# Patient Record
Sex: MALE | Race: OTHER | Employment: UNEMPLOYED | URBAN - NONMETROPOLITAN AREA
[De-identification: names, ages, dates, MRNs, and addresses within clinical notes are randomized per-mention and may not be internally consistent; named-entity substitution may affect disease eponyms.]

---

## 2024-11-01 ENCOUNTER — APPOINTMENT (OUTPATIENT)
Dept: CT IMAGING | Age: 24
End: 2024-11-01
Payer: MEDICAID

## 2024-11-01 ENCOUNTER — HOSPITAL ENCOUNTER (INPATIENT)
Age: 24
LOS: 6 days | Discharge: HOME | End: 2024-11-08
Attending: STUDENT IN AN ORGANIZED HEALTH CARE EDUCATION/TRAINING PROGRAM | Admitting: SURGERY
Payer: MEDICAID

## 2024-11-01 DIAGNOSIS — K35.80 ACUTE APPENDICITIS, UNSPECIFIED ACUTE APPENDICITIS TYPE: ICD-10-CM

## 2024-11-01 DIAGNOSIS — K63.89 CECUM MASS: Primary | ICD-10-CM

## 2024-11-01 DIAGNOSIS — K35.209 ACUTE APPENDICITIS WITH GENERALIZED PERITONITIS, UNSPECIFIED WHETHER ABSCESS PRESENT, UNSPECIFIED WHETHER GANGRENE PRESENT, UNSPECIFIED WHETHER PERFORATION PRESENT: ICD-10-CM

## 2024-11-01 LAB
ALBUMIN SERPL BCG-MCNC: 4.6 G/DL (ref 3.5–5.1)
ALP SERPL-CCNC: 74 U/L (ref 38–126)
ALT SERPL W/O P-5'-P-CCNC: 21 U/L (ref 11–66)
ANION GAP SERPL CALC-SCNC: 13 MEQ/L (ref 8–16)
AST SERPL-CCNC: 20 U/L (ref 5–40)
BILIRUB SERPL-MCNC: 0.9 MG/DL (ref 0.3–1.2)
BUN SERPL-MCNC: 10 MG/DL (ref 7–22)
CALCIUM SERPL-MCNC: 9.5 MG/DL (ref 8.5–10.5)
CHLORIDE SERPL-SCNC: 100 MEQ/L (ref 98–111)
CO2 SERPL-SCNC: 24 MEQ/L (ref 23–33)
CREAT SERPL-MCNC: 0.9 MG/DL (ref 0.4–1.2)
GFR SERPL CREATININE-BSD FRML MDRD: > 90 ML/MIN/1.73M2
GLUCOSE SERPL-MCNC: 110 MG/DL (ref 70–108)
LACTATE SERPL-SCNC: 1.4 MMOL/L (ref 0.5–2)
LIPASE SERPL-CCNC: 22.5 U/L (ref 5.6–51.3)
OSMOLALITY SERPL CALC.SUM OF ELEC: 273.5 MOSMOL/KG (ref 275–300)
POTASSIUM SERPL-SCNC: 3.9 MEQ/L (ref 3.5–5.2)
PROCALCITONIN SERPL IA-MCNC: 0.05 NG/ML (ref 0.01–0.09)
PROT SERPL-MCNC: 7.9 G/DL (ref 6.1–8)
SODIUM SERPL-SCNC: 137 MEQ/L (ref 135–145)

## 2024-11-01 PROCEDURE — 84145 PROCALCITONIN (PCT): CPT

## 2024-11-01 PROCEDURE — 6370000000 HC RX 637 (ALT 250 FOR IP): Performed by: STUDENT IN AN ORGANIZED HEALTH CARE EDUCATION/TRAINING PROGRAM

## 2024-11-01 PROCEDURE — 87636 SARSCOV2 & INF A&B AMP PRB: CPT

## 2024-11-01 PROCEDURE — 36415 COLL VENOUS BLD VENIPUNCTURE: CPT

## 2024-11-01 PROCEDURE — 99285 EMERGENCY DEPT VISIT HI MDM: CPT

## 2024-11-01 PROCEDURE — 6360000002 HC RX W HCPCS: Performed by: STUDENT IN AN ORGANIZED HEALTH CARE EDUCATION/TRAINING PROGRAM

## 2024-11-01 PROCEDURE — 96361 HYDRATE IV INFUSION ADD-ON: CPT

## 2024-11-01 PROCEDURE — 2580000003 HC RX 258: Performed by: STUDENT IN AN ORGANIZED HEALTH CARE EDUCATION/TRAINING PROGRAM

## 2024-11-01 PROCEDURE — 83605 ASSAY OF LACTIC ACID: CPT

## 2024-11-01 PROCEDURE — 96375 TX/PRO/DX INJ NEW DRUG ADDON: CPT

## 2024-11-01 PROCEDURE — 93005 ELECTROCARDIOGRAM TRACING: CPT | Performed by: STUDENT IN AN ORGANIZED HEALTH CARE EDUCATION/TRAINING PROGRAM

## 2024-11-01 PROCEDURE — 83690 ASSAY OF LIPASE: CPT

## 2024-11-01 PROCEDURE — 87040 BLOOD CULTURE FOR BACTERIA: CPT

## 2024-11-01 PROCEDURE — 74177 CT ABD & PELVIS W/CONTRAST: CPT

## 2024-11-01 PROCEDURE — 80053 COMPREHEN METABOLIC PANEL: CPT

## 2024-11-01 PROCEDURE — 85025 COMPLETE CBC W/AUTO DIFF WBC: CPT

## 2024-11-01 RX ORDER — IOPAMIDOL 755 MG/ML
80 INJECTION, SOLUTION INTRAVASCULAR
Status: COMPLETED | OUTPATIENT
Start: 2024-11-01 | End: 2024-11-02

## 2024-11-01 RX ORDER — 0.9 % SODIUM CHLORIDE 0.9 %
1000 INTRAVENOUS SOLUTION INTRAVENOUS ONCE
Status: COMPLETED | OUTPATIENT
Start: 2024-11-01 | End: 2024-11-02

## 2024-11-01 RX ORDER — MORPHINE SULFATE 4 MG/ML
4 INJECTION, SOLUTION INTRAMUSCULAR; INTRAVENOUS
Status: COMPLETED | OUTPATIENT
Start: 2024-11-01 | End: 2024-11-01

## 2024-11-01 RX ORDER — ONDANSETRON 2 MG/ML
4 INJECTION INTRAMUSCULAR; INTRAVENOUS ONCE
Status: COMPLETED | OUTPATIENT
Start: 2024-11-01 | End: 2024-11-01

## 2024-11-01 RX ORDER — ACETAMINOPHEN 500 MG
1000 TABLET ORAL ONCE
Status: COMPLETED | OUTPATIENT
Start: 2024-11-01 | End: 2024-11-01

## 2024-11-01 RX ADMIN — ACETAMINOPHEN 1000 MG: 500 TABLET ORAL at 23:22

## 2024-11-01 RX ADMIN — ONDANSETRON 4 MG: 2 INJECTION INTRAMUSCULAR; INTRAVENOUS at 23:21

## 2024-11-01 RX ADMIN — SODIUM CHLORIDE 1000 ML: 9 INJECTION, SOLUTION INTRAVENOUS at 23:20

## 2024-11-01 RX ADMIN — MORPHINE SULFATE 4 MG: 4 INJECTION, SOLUTION INTRAMUSCULAR; INTRAVENOUS at 23:22

## 2024-11-01 ASSESSMENT — PAIN SCALES - GENERAL
PAINLEVEL_OUTOF10: 10
PAINLEVEL_OUTOF10: 8

## 2024-11-01 ASSESSMENT — PAIN - FUNCTIONAL ASSESSMENT
PAIN_FUNCTIONAL_ASSESSMENT: 0-10
PAIN_FUNCTIONAL_ASSESSMENT: 0-10

## 2024-11-02 ENCOUNTER — ANESTHESIA EVENT (OUTPATIENT)
Dept: OPERATING ROOM | Age: 24
End: 2024-11-02
Payer: MEDICAID

## 2024-11-02 ENCOUNTER — ANESTHESIA (OUTPATIENT)
Dept: OPERATING ROOM | Age: 24
End: 2024-11-02
Payer: MEDICAID

## 2024-11-02 PROBLEM — K63.89 CECUM MASS: Status: ACTIVE | Noted: 2024-11-02

## 2024-11-02 PROBLEM — Z90.49 STATUS POST APPENDECTOMY: Status: ACTIVE | Noted: 2024-11-02

## 2024-11-02 LAB
BASOPHILS ABSOLUTE: 0 THOU/MM3 (ref 0–0.1)
BASOPHILS NFR BLD AUTO: 0.2 %
BILIRUB UR QL STRIP.AUTO: NEGATIVE
CHARACTER UR: CLEAR
COLOR, UA: YELLOW
DEPRECATED RDW RBC AUTO: 43 FL (ref 35–45)
EKG ATRIAL RATE: 114 BPM
EKG P AXIS: 59 DEGREES
EKG P-R INTERVAL: 172 MS
EKG Q-T INTERVAL: 298 MS
EKG QRS DURATION: 80 MS
EKG QTC CALCULATION (BAZETT): 410 MS
EKG R AXIS: 122 DEGREES
EKG T AXIS: 35 DEGREES
EKG VENTRICULAR RATE: 114 BPM
EOSINOPHIL NFR BLD AUTO: 0.3 %
EOSINOPHILS ABSOLUTE: 0.1 THOU/MM3 (ref 0–0.4)
ERYTHROCYTE [DISTWIDTH] IN BLOOD BY AUTOMATED COUNT: 12.5 % (ref 11.5–14.5)
FLUAV RNA RESP QL NAA+PROBE: NOT DETECTED
FLUBV RNA RESP QL NAA+PROBE: NOT DETECTED
GLUCOSE UR QL STRIP.AUTO: NEGATIVE MG/DL
HCT VFR BLD AUTO: 54.8 % (ref 42–52)
HGB BLD-MCNC: 18.5 GM/DL (ref 14–18)
HGB UR QL STRIP.AUTO: NEGATIVE
IMM GRANULOCYTES # BLD AUTO: 0.07 THOU/MM3 (ref 0–0.07)
IMM GRANULOCYTES NFR BLD AUTO: 0.4 %
KETONES UR QL STRIP.AUTO: NEGATIVE
LYMPHOCYTES ABSOLUTE: 2.5 THOU/MM3 (ref 1–4.8)
LYMPHOCYTES NFR BLD AUTO: 13.8 %
MCH RBC QN AUTO: 31.6 PG (ref 26–33)
MCHC RBC AUTO-ENTMCNC: 33.8 GM/DL (ref 32.2–35.5)
MCV RBC AUTO: 93.7 FL (ref 80–94)
MONOCYTES ABSOLUTE: 1.3 THOU/MM3 (ref 0.4–1.3)
MONOCYTES NFR BLD AUTO: 7 %
NEUTROPHILS ABSOLUTE: 14.3 THOU/MM3 (ref 1.8–7.7)
NEUTROPHILS NFR BLD AUTO: 78.3 %
NITRITE UR QL STRIP: NEGATIVE
NRBC BLD AUTO-RTO: 0 /100 WBC
PATHOLOGIST REVIEW: ABNORMAL
PH UR STRIP.AUTO: 6.5 [PH] (ref 5–9)
PLATELET # BLD AUTO: 208 THOU/MM3 (ref 130–400)
PMV BLD AUTO: 11.3 FL (ref 9.4–12.4)
PROT UR STRIP.AUTO-MCNC: NEGATIVE MG/DL
RBC # BLD AUTO: 5.85 MILL/MM3 (ref 4.7–6.1)
SARS-COV-2 RNA RESP QL NAA+PROBE: NOT DETECTED
SP GR UR REFRACT.AUTO: 1.02 (ref 1–1.03)
UROBILINOGEN, URINE: 1 EU/DL (ref 0–1)
WBC # BLD AUTO: 18.3 THOU/MM3 (ref 4.8–10.8)
WBC #/AREA URNS HPF: NEGATIVE /[HPF]

## 2024-11-02 PROCEDURE — 6360000002 HC RX W HCPCS: Performed by: STUDENT IN AN ORGANIZED HEALTH CARE EDUCATION/TRAINING PROGRAM

## 2024-11-02 PROCEDURE — 93010 ELECTROCARDIOGRAM REPORT: CPT | Performed by: INTERNAL MEDICINE

## 2024-11-02 PROCEDURE — 96365 THER/PROPH/DIAG IV INF INIT: CPT

## 2024-11-02 PROCEDURE — 2580000003 HC RX 258: Performed by: NURSE ANESTHETIST, CERTIFIED REGISTERED

## 2024-11-02 PROCEDURE — 2720000010 HC SURG SUPPLY STERILE: Performed by: SURGERY

## 2024-11-02 PROCEDURE — 6360000002 HC RX W HCPCS: Performed by: SURGERY

## 2024-11-02 PROCEDURE — 2580000003 HC RX 258: Performed by: SURGERY

## 2024-11-02 PROCEDURE — 3700000001 HC ADD 15 MINUTES (ANESTHESIA): Performed by: SURGERY

## 2024-11-02 PROCEDURE — 6360000002 HC RX W HCPCS: Performed by: OCCUPATIONAL THERAPIST

## 2024-11-02 PROCEDURE — 88307 TISSUE EXAM BY PATHOLOGIST: CPT

## 2024-11-02 PROCEDURE — 6360000002 HC RX W HCPCS: Performed by: NURSE ANESTHETIST, CERTIFIED REGISTERED

## 2024-11-02 PROCEDURE — 1200000000 HC SEMI PRIVATE

## 2024-11-02 PROCEDURE — 96361 HYDRATE IV INFUSION ADD-ON: CPT

## 2024-11-02 PROCEDURE — 0DTF0ZZ RESECTION OF RIGHT LARGE INTESTINE, OPEN APPROACH: ICD-10-PCS | Performed by: SURGERY

## 2024-11-02 PROCEDURE — 2500000003 HC RX 250 WO HCPCS: Performed by: NURSE ANESTHETIST, CERTIFIED REGISTERED

## 2024-11-02 PROCEDURE — 7100000000 HC PACU RECOVERY - FIRST 15 MIN: Performed by: SURGERY

## 2024-11-02 PROCEDURE — 2580000003 HC RX 258: Performed by: STUDENT IN AN ORGANIZED HEALTH CARE EDUCATION/TRAINING PROGRAM

## 2024-11-02 PROCEDURE — 3600000014 HC SURGERY LEVEL 4 ADDTL 15MIN: Performed by: SURGERY

## 2024-11-02 PROCEDURE — 81003 URINALYSIS AUTO W/O SCOPE: CPT

## 2024-11-02 PROCEDURE — 6360000004 HC RX CONTRAST MEDICATION: Performed by: STUDENT IN AN ORGANIZED HEALTH CARE EDUCATION/TRAINING PROGRAM

## 2024-11-02 PROCEDURE — 2709999900 HC NON-CHARGEABLE SUPPLY: Performed by: SURGERY

## 2024-11-02 PROCEDURE — 7100000001 HC PACU RECOVERY - ADDTL 15 MIN: Performed by: SURGERY

## 2024-11-02 PROCEDURE — 3700000000 HC ANESTHESIA ATTENDED CARE: Performed by: SURGERY

## 2024-11-02 PROCEDURE — 3600000004 HC SURGERY LEVEL 4 BASE: Performed by: SURGERY

## 2024-11-02 RX ORDER — PROPOFOL 10 MG/ML
INJECTION, EMULSION INTRAVENOUS
Status: DISCONTINUED | OUTPATIENT
Start: 2024-11-02 | End: 2024-11-02 | Stop reason: SDUPTHER

## 2024-11-02 RX ORDER — HYDROMORPHONE HYDROCHLORIDE 2 MG/ML
INJECTION, SOLUTION INTRAMUSCULAR; INTRAVENOUS; SUBCUTANEOUS
Status: DISCONTINUED | OUTPATIENT
Start: 2024-11-02 | End: 2024-11-02 | Stop reason: SDUPTHER

## 2024-11-02 RX ORDER — MEPERIDINE HYDROCHLORIDE 25 MG/ML
12.5 INJECTION INTRAMUSCULAR; INTRAVENOUS; SUBCUTANEOUS EVERY 5 MIN PRN
Status: DISCONTINUED | OUTPATIENT
Start: 2024-11-02 | End: 2024-11-02

## 2024-11-02 RX ORDER — NALOXONE HYDROCHLORIDE 0.4 MG/ML
INJECTION, SOLUTION INTRAMUSCULAR; INTRAVENOUS; SUBCUTANEOUS PRN
Status: DISCONTINUED | OUTPATIENT
Start: 2024-11-02 | End: 2024-11-02

## 2024-11-02 RX ORDER — SODIUM CHLORIDE 9 MG/ML
INJECTION, SOLUTION INTRAVENOUS CONTINUOUS
Status: DISCONTINUED | OUTPATIENT
Start: 2024-11-02 | End: 2024-11-07

## 2024-11-02 RX ORDER — SODIUM CHLORIDE 9 MG/ML
INJECTION, SOLUTION INTRAVENOUS PRN
Status: DISCONTINUED | OUTPATIENT
Start: 2024-11-02 | End: 2024-11-02

## 2024-11-02 RX ORDER — ONDANSETRON 2 MG/ML
4 INJECTION INTRAMUSCULAR; INTRAVENOUS EVERY 6 HOURS PRN
Status: DISCONTINUED | OUTPATIENT
Start: 2024-11-02 | End: 2024-11-08 | Stop reason: HOSPADM

## 2024-11-02 RX ORDER — SODIUM CHLORIDE 0.9 % (FLUSH) 0.9 %
5-40 SYRINGE (ML) INJECTION PRN
Status: DISCONTINUED | OUTPATIENT
Start: 2024-11-02 | End: 2024-11-02

## 2024-11-02 RX ORDER — DEXAMETHASONE SODIUM PHOSPHATE 10 MG/ML
INJECTION, EMULSION INTRAMUSCULAR; INTRAVENOUS
Status: DISCONTINUED | OUTPATIENT
Start: 2024-11-02 | End: 2024-11-02 | Stop reason: SDUPTHER

## 2024-11-02 RX ORDER — ONDANSETRON 4 MG/1
4 TABLET, ORALLY DISINTEGRATING ORAL EVERY 8 HOURS PRN
Status: DISCONTINUED | OUTPATIENT
Start: 2024-11-02 | End: 2024-11-08 | Stop reason: HOSPADM

## 2024-11-02 RX ORDER — ONDANSETRON 2 MG/ML
4 INJECTION INTRAMUSCULAR; INTRAVENOUS
Status: DISCONTINUED | OUTPATIENT
Start: 2024-11-02 | End: 2024-11-02

## 2024-11-02 RX ORDER — SODIUM CHLORIDE, SODIUM LACTATE, POTASSIUM CHLORIDE, CALCIUM CHLORIDE 600; 310; 30; 20 MG/100ML; MG/100ML; MG/100ML; MG/100ML
INJECTION, SOLUTION INTRAVENOUS
Status: DISCONTINUED | OUTPATIENT
Start: 2024-11-02 | End: 2024-11-02 | Stop reason: SDUPTHER

## 2024-11-02 RX ORDER — SUCCINYLCHOLINE/SOD CL,ISO/PF 200MG/10ML
SYRINGE (ML) INTRAVENOUS
Status: DISCONTINUED | OUTPATIENT
Start: 2024-11-02 | End: 2024-11-02 | Stop reason: SDUPTHER

## 2024-11-02 RX ORDER — PHENYLEPHRINE HCL IN 0.9% NACL 1 MG/10 ML
SYRINGE (ML) INTRAVENOUS
Status: DISCONTINUED | OUTPATIENT
Start: 2024-11-02 | End: 2024-11-02 | Stop reason: SDUPTHER

## 2024-11-02 RX ORDER — SODIUM CHLORIDE 0.9 % (FLUSH) 0.9 %
5-40 SYRINGE (ML) INJECTION EVERY 12 HOURS SCHEDULED
Status: DISCONTINUED | OUTPATIENT
Start: 2024-11-02 | End: 2024-11-02

## 2024-11-02 RX ORDER — BUPIVACAINE HYDROCHLORIDE 5 MG/ML
INJECTION, SOLUTION EPIDURAL; INTRACAUDAL PRN
Status: DISCONTINUED | OUTPATIENT
Start: 2024-11-02 | End: 2024-11-02 | Stop reason: ALTCHOICE

## 2024-11-02 RX ORDER — PROMETHAZINE HYDROCHLORIDE 25 MG/ML
6.25 INJECTION, SOLUTION INTRAMUSCULAR; INTRAVENOUS EVERY 6 HOURS PRN
Status: DISCONTINUED | OUTPATIENT
Start: 2024-11-02 | End: 2024-11-08 | Stop reason: HOSPADM

## 2024-11-02 RX ORDER — SODIUM CHLORIDE 9 MG/ML
INJECTION, SOLUTION INTRAVENOUS PRN
Status: DISCONTINUED | OUTPATIENT
Start: 2024-11-02 | End: 2024-11-07

## 2024-11-02 RX ORDER — FENTANYL CITRATE 50 UG/ML
INJECTION, SOLUTION INTRAMUSCULAR; INTRAVENOUS
Status: DISCONTINUED | OUTPATIENT
Start: 2024-11-02 | End: 2024-11-02 | Stop reason: SDUPTHER

## 2024-11-02 RX ORDER — LIDOCAINE HCL/PF 100 MG/5ML
SYRINGE (ML) INJECTION
Status: DISCONTINUED | OUTPATIENT
Start: 2024-11-02 | End: 2024-11-02 | Stop reason: SDUPTHER

## 2024-11-02 RX ORDER — ONDANSETRON 2 MG/ML
INJECTION INTRAMUSCULAR; INTRAVENOUS
Status: DISCONTINUED | OUTPATIENT
Start: 2024-11-02 | End: 2024-11-02 | Stop reason: SDUPTHER

## 2024-11-02 RX ORDER — SODIUM CHLORIDE 0.9 % (FLUSH) 0.9 %
5-40 SYRINGE (ML) INJECTION EVERY 12 HOURS SCHEDULED
Status: DISCONTINUED | OUTPATIENT
Start: 2024-11-02 | End: 2024-11-07

## 2024-11-02 RX ORDER — MIDAZOLAM HYDROCHLORIDE 1 MG/ML
INJECTION, SOLUTION INTRAMUSCULAR; INTRAVENOUS
Status: DISCONTINUED | OUTPATIENT
Start: 2024-11-02 | End: 2024-11-02 | Stop reason: SDUPTHER

## 2024-11-02 RX ORDER — ROCURONIUM BROMIDE 10 MG/ML
INJECTION, SOLUTION INTRAVENOUS
Status: DISCONTINUED | OUTPATIENT
Start: 2024-11-02 | End: 2024-11-02 | Stop reason: SDUPTHER

## 2024-11-02 RX ORDER — SODIUM CHLORIDE 0.9 % (FLUSH) 0.9 %
5-40 SYRINGE (ML) INJECTION PRN
Status: DISCONTINUED | OUTPATIENT
Start: 2024-11-02 | End: 2024-11-07

## 2024-11-02 RX ORDER — FENTANYL CITRATE 50 UG/ML
50 INJECTION, SOLUTION INTRAMUSCULAR; INTRAVENOUS EVERY 5 MIN PRN
Status: DISCONTINUED | OUTPATIENT
Start: 2024-11-02 | End: 2024-11-02

## 2024-11-02 RX ADMIN — DEXAMETHASONE SODIUM PHOSPHATE 10 MG: 10 INJECTION, EMULSION INTRAMUSCULAR; INTRAVENOUS at 02:07

## 2024-11-02 RX ADMIN — HYDROMORPHONE HYDROCHLORIDE 0.4 MG: 2 INJECTION INTRAMUSCULAR; INTRAVENOUS; SUBCUTANEOUS at 03:46

## 2024-11-02 RX ADMIN — ROCURONIUM BROMIDE 30 MG: 10 INJECTION INTRAVENOUS at 02:12

## 2024-11-02 RX ADMIN — Medication 140 MG: at 02:02

## 2024-11-02 RX ADMIN — SODIUM CHLORIDE: 9 INJECTION, SOLUTION INTRAVENOUS at 20:39

## 2024-11-02 RX ADMIN — HYDROMORPHONE HYDROCHLORIDE 0.4 MG: 2 INJECTION INTRAMUSCULAR; INTRAVENOUS; SUBCUTANEOUS at 03:38

## 2024-11-02 RX ADMIN — HYDROMORPHONE HYDROCHLORIDE 0.4 MG: 2 INJECTION INTRAMUSCULAR; INTRAVENOUS; SUBCUTANEOUS at 03:55

## 2024-11-02 RX ADMIN — Medication 100 MCG: at 02:22

## 2024-11-02 RX ADMIN — ONDANSETRON 4 MG: 2 INJECTION INTRAMUSCULAR; INTRAVENOUS at 03:22

## 2024-11-02 RX ADMIN — HYDROMORPHONE HYDROCHLORIDE 0.4 MG: 2 INJECTION INTRAMUSCULAR; INTRAVENOUS; SUBCUTANEOUS at 03:27

## 2024-11-02 RX ADMIN — ONDANSETRON 4 MG: 2 INJECTION INTRAMUSCULAR; INTRAVENOUS at 10:52

## 2024-11-02 RX ADMIN — Medication 100 MG: at 02:02

## 2024-11-02 RX ADMIN — SUGAMMADEX 200 MG: 100 INJECTION, SOLUTION INTRAVENOUS at 03:30

## 2024-11-02 RX ADMIN — HYDROMORPHONE HYDROCHLORIDE 1 MG: 1 INJECTION, SOLUTION INTRAMUSCULAR; INTRAVENOUS; SUBCUTANEOUS at 07:00

## 2024-11-02 RX ADMIN — IOPAMIDOL 80 ML: 755 INJECTION, SOLUTION INTRAVENOUS at 00:17

## 2024-11-02 RX ADMIN — SODIUM CHLORIDE, POTASSIUM CHLORIDE, SODIUM LACTATE AND CALCIUM CHLORIDE: 600; 310; 30; 20 INJECTION, SOLUTION INTRAVENOUS at 03:24

## 2024-11-02 RX ADMIN — HYDROMORPHONE HYDROCHLORIDE 1 MG: 1 INJECTION, SOLUTION INTRAMUSCULAR; INTRAVENOUS; SUBCUTANEOUS at 12:44

## 2024-11-02 RX ADMIN — HYDROMORPHONE HYDROCHLORIDE 0.4 MG: 2 INJECTION INTRAMUSCULAR; INTRAVENOUS; SUBCUTANEOUS at 03:51

## 2024-11-02 RX ADMIN — PROPOFOL 150 MG: 10 INJECTION, EMULSION INTRAVENOUS at 02:02

## 2024-11-02 RX ADMIN — Medication 100 MCG: at 02:38

## 2024-11-02 RX ADMIN — FENTANYL CITRATE 100 MCG: 50 INJECTION, SOLUTION INTRAMUSCULAR; INTRAVENOUS at 02:00

## 2024-11-02 RX ADMIN — PIPERACILLIN AND TAZOBACTAM 4500 MG: 4; .5 INJECTION, POWDER, FOR SOLUTION INTRAVENOUS at 01:08

## 2024-11-02 RX ADMIN — ONDANSETRON 4 MG: 2 INJECTION INTRAMUSCULAR; INTRAVENOUS at 20:01

## 2024-11-02 RX ADMIN — ROCURONIUM BROMIDE 20 MG: 10 INJECTION INTRAVENOUS at 02:45

## 2024-11-02 RX ADMIN — PIPERACILLIN AND TAZOBACTAM 3375 MG: 3; .375 INJECTION, POWDER, FOR SOLUTION INTRAVENOUS at 23:15

## 2024-11-02 RX ADMIN — PIPERACILLIN AND TAZOBACTAM 3375 MG: 3; .375 INJECTION, POWDER, FOR SOLUTION INTRAVENOUS at 15:03

## 2024-11-02 RX ADMIN — SODIUM CHLORIDE: 9 INJECTION, SOLUTION INTRAVENOUS at 04:48

## 2024-11-02 RX ADMIN — MIDAZOLAM 2 MG: 1 INJECTION INTRAMUSCULAR; INTRAVENOUS at 01:59

## 2024-11-02 RX ADMIN — PIPERACILLIN AND TAZOBACTAM 3375 MG: 3; .375 INJECTION, POWDER, FOR SOLUTION INTRAVENOUS at 07:07

## 2024-11-02 RX ADMIN — SODIUM CHLORIDE, POTASSIUM CHLORIDE, SODIUM LACTATE AND CALCIUM CHLORIDE: 600; 310; 30; 20 INJECTION, SOLUTION INTRAVENOUS at 01:56

## 2024-11-02 RX ADMIN — SODIUM CHLORIDE: 9 INJECTION, SOLUTION INTRAVENOUS at 12:49

## 2024-11-02 RX ADMIN — PROMETHAZINE HYDROCHLORIDE 6.25 MG: 25 INJECTION INTRAMUSCULAR; INTRAVENOUS at 21:18

## 2024-11-02 ASSESSMENT — PAIN DESCRIPTION - LOCATION
LOCATION: ABDOMEN

## 2024-11-02 ASSESSMENT — PAIN - FUNCTIONAL ASSESSMENT
PAIN_FUNCTIONAL_ASSESSMENT: ACTIVITIES ARE NOT PREVENTED
PAIN_FUNCTIONAL_ASSESSMENT: 0-10
PAIN_FUNCTIONAL_ASSESSMENT: ACTIVITIES ARE NOT PREVENTED

## 2024-11-02 ASSESSMENT — PAIN SCALES - GENERAL
PAINLEVEL_OUTOF10: 10
PAINLEVEL_OUTOF10: 1
PAINLEVEL_OUTOF10: 8
PAINLEVEL_OUTOF10: 3

## 2024-11-02 ASSESSMENT — PAIN DESCRIPTION - ORIENTATION
ORIENTATION: MID

## 2024-11-02 ASSESSMENT — PAIN DESCRIPTION - PAIN TYPE
TYPE: SURGICAL PAIN
TYPE: SURGICAL PAIN

## 2024-11-02 ASSESSMENT — PAIN DESCRIPTION - DESCRIPTORS
DESCRIPTORS: SORE;TENDER
DESCRIPTORS: ACHING;SORE
DESCRIPTORS: SORE;TENDER
DESCRIPTORS: ACHING;SORE
DESCRIPTORS: SHARP

## 2024-11-02 ASSESSMENT — PAIN SCALES - WONG BAKER
WONGBAKER_NUMERICALRESPONSE: HURTS A LITTLE BIT

## 2024-11-02 NOTE — ANESTHESIA PRE PROCEDURE
Department of Anesthesiology  Preprocedure Note       Name:  Gabino Nowak   Age:  24 y.o.  :  2000                                          MRN:  [id number]         Date:  2024      Surgeon: Surgeon(s):  Pietro Castro MD    Procedure: Procedure(s):  APPENDECTOMY LAPAROSCOPIC    Medications prior to admission:   Prior to Admission medications    Not on File       Current medications:    No current facility-administered medications for this encounter.       Allergies:  No Known Allergies    Problem List:  There is no problem list on file for this patient.      Past Medical History:  History reviewed. No pertinent past medical history.    Past Surgical History:  History reviewed. No pertinent surgical history.    Social History:    Social History     Tobacco Use   • Smoking status: Not on file   • Smokeless tobacco: Not on file   Substance Use Topics   • Alcohol use: Not on file                                Counseling given: Not Answered      Vital Signs (Current):   Vitals:    24 2136 24 2326 24 0041   BP: 129/83 116/68 106/68   Pulse: (!) 119 (!) 117 97   Resp: 16 17 16   Temp: (!) 101.1 °F (38.4 °C)     TempSrc: Oral     SpO2: 95% 96% 94%   Weight: 81.6 kg (180 lb)                                                BP Readings from Last 3 Encounters:   24 106/68       NPO Status:                                                                                 BMI:   Wt Readings from Last 3 Encounters:   24 81.6 kg (180 lb)     There is no height or weight on file to calculate BMI.    CBC:   Lab Results   Component Value Date/Time    WBC 18.3 2024 09:56 PM    RBC 5.85 2024 09:56 PM    HGB 18.5 2024 09:56 PM    HCT 54.8 2024 09:56 PM    MCV 93.7 2024 09:56 PM     2024 09:56 PM       CMP:   Lab Results   Component Value Date/Time     2024 09:56 PM    K 3.9 2024 09:56 PM     2024 09:56 PM    CO2 24

## 2024-11-02 NOTE — CONSULTS
Session ID: [id number]  Language: Upper sorbian   ID: #[id number]   Name: Manny Liang Olena Velez  (RN)  2021 00:42:49

## 2024-11-02 NOTE — ED PROVIDER NOTES
[facility]  EMERGENCY DEPARTMENT ENCOUNTER      Patient Name: Gabino Nowak  MRN: [id number]  YOB: 2000  Date of Evaluation: 11/1/2024  Attending Physician: Hayden Rodriguez MD    CHIEF COMPLAINT       Chief Complaint   Patient presents with    Abdominal Pain       HISTORY OF PRESENT ILLNESS    HPI    History obtained from chart review and the patient.  History and discussion with patient was using the Occitan video .    Gabino is a 24 y.o. old male who presents to the emergency department by Walk In for evaluation of right lower quadrant pain, fever, decreased appetite.  No other chronic medical problems, does use a vape with nicotine daily.  No prior abdominal surgeries.  Today started having right lower quadrant pain and this morning is gotten worse over the course of the day worse with movement.  Felt warm like he had a fever at home but did not take any medications at home.  Has only had a small piece of chocolate and water today and no nausea or vomiting.    Attempted chart review, no prior visits available to review.      REVIEW OF SYSTEMS   Review of Systems  Negative unless documented in HPI    PAST MEDICAL AND SURGICAL HISTORY   Gabino  has no past medical history on file.    Gabino  has no past surgical history on file.    CURRENT MEDICATIONS   Gabino is not on any long-term medications.    ALLERGIES   Gabino has No Known Allergies.    FAMILY HISTORY   Gabino family history is not on file.    SOCIAL HISTORY   Gabino      PHYSICAL EXAM     ED Triage Vitals [11/01/24 2136]   BP Systolic BP Percentile Diastolic BP Percentile Temp Temp Source Pulse Respirations SpO2   129/83 -- -- (!) 101.1 °F (38.4 °C) Oral (!) 119 16 95 %      Height Weight - Scale         -- 81.6 kg (180 lb)             Initial vital signs and nursing assessment reviewed and abnormal from tachycardia, febrile . There is no height or weight on file to calculate BMI.     Vitals:    11/01/24  2136 11/01/24 2326 11/02/24 0041   BP: 129/83 116/68 106/68   Pulse: (!) 119 (!) 117 97   Resp: 16 17 16   Temp: (!) 101.1 °F (38.4 °C)     TempSrc: Oral     SpO2: 95% 96% 94%   Weight: 81.6 kg (180 lb)           Physical Exam  Vitals and nursing note reviewed.   Constitutional:       General: He is not in acute distress.     Appearance: Normal appearance. He is normal weight. He is not ill-appearing, toxic-appearing or diaphoretic.   HENT:      Head: Normocephalic and atraumatic.      Nose: Nose normal.      Mouth/Throat:      Mouth: Mucous membranes are moist.      Pharynx: Oropharynx is clear.   Eyes:      Conjunctiva/sclera: Conjunctivae normal.   Cardiovascular:      Rate and Rhythm: Normal rate and regular rhythm.      Pulses: Normal pulses.      Heart sounds: Normal heart sounds.   Pulmonary:      Effort: Pulmonary effort is normal.      Breath sounds: Normal breath sounds.   Abdominal:      General: Abdomen is flat. Bowel sounds are normal.      Palpations: Abdomen is soft.      Tenderness: There is abdominal tenderness in the right lower quadrant and suprapubic area. There is guarding. There is no rebound. Positive signs include McBurney's sign and psoas sign. Negative signs include Rovsing's sign and obturator sign.   Musculoskeletal:      Cervical back: Normal range of motion.   Skin:     General: Skin is warm and dry.      Capillary Refill: Capillary refill takes less than 2 seconds.   Neurological:      Mental Status: He is alert and oriented to person, place, and time.            FORMAL DIAGNOSTIC RESULTS   RADIOLOGY: Interpretation per the Radiologist below, if available at the time of this note (none if blank):  CT ABDOMEN PELVIS W IV CONTRAST Additional Contrast? None   Final Result   Impression:   High-grade inflammatory process involving the cecum and proximal right    colon as well as terminal ileum into lesser extent proximal appendix. See    above for discussion. General surgery consultation

## 2024-11-02 NOTE — PLAN OF CARE
Problem: Discharge Planning  Goal: Discharge to home or other facility with appropriate resources  11/2/2024 1229 by Eboni Mendoza, RN  Outcome: Progressing  Flowsheets (Taken 11/2/2024 0425 by Adama Hsu, RN)  Discharge to home or other facility with appropriate resources:   Identify barriers to discharge with patient and caregiver   Arrange for needed discharge resources and transportation as appropriate   Identify discharge learning needs (meds, wound care, etc)   Refer to discharge planning if patient needs post-hospital services based on physician order or complex needs related to functional status, cognitive ability or social support system  11/2/2024 0507 by Adama Hsu, RN  Outcome: Progressing  Flowsheets (Taken 11/2/2024 0425)  Discharge to home or other facility with appropriate resources:   Identify barriers to discharge with patient and caregiver   Arrange for needed discharge resources and transportation as appropriate   Identify discharge learning needs (meds, wound care, etc)   Refer to discharge planning if patient needs post-hospital services based on physician order or complex needs related to functional status, cognitive ability or social support system     Problem: Pain  Goal: Verbalizes/displays adequate comfort level or baseline comfort level  11/2/2024 1229 by Eboni Mendoza, RN  Outcome: Progressing  Flowsheets (Taken 11/2/2024 0507 by Adama Hsu, RN)  Verbalizes/displays adequate comfort level or baseline comfort level:   Encourage patient to monitor pain and request assistance   Administer analgesics based on type and severity of pain and evaluate response   Assess pain using appropriate pain scale   Implement non-pharmacological measures as appropriate and evaluate response   Notify Licensed Independent Practitioner if interventions unsuccessful or patient reports new pain   Consider cultural and social influences on pain and pain management  11/2/2024 0507 by

## 2024-11-02 NOTE — ED NOTES
Pt reports to the ED due to abdominal pain that started this morning. Pt is concerned for appendicitis. Pt states pain started gradually and has gotten worse. Pt states he is unable to sit or stand. Pt has not taken anything for the pain. Pt is unable to locate the area of the pain in his abdomen. Pt rates pain 10/10.

## 2024-11-02 NOTE — PROGRESS NOTES
0348 Pt awake and oriented on arrival to PACU , HOB elevated , pt c/o # 6-7 Abd pain per  [place] [id number] , medicated with Dilaudid per MATTHEW Martines   355 eyes closed resp easy   415 awakens to name , states pain tolerable and drifts back to sleep   419 Meets criteria for discharge , transported to

## 2024-11-02 NOTE — OP NOTE
[address] [address]                            OPERATIVE REPORT      PATIENT NAME: BRIAN ROSALES           : 2000  MED REC NO: [id number]                       ROOM: [facility]  ACCOUNT NO: [id number]                       ADMIT DATE: 2024  PROVIDER: Pietro Castro MD      DATE OF PROCEDURE:  2024    SURGEON:  Pietro Castro MD    PREOPERATIVE DIAGNOSIS:  Possible acute appendicitis.    POSTOPERATIVE DIAGNOSIS:  Inflammatory cecal mass.    OPERATIONS:    1. Laparoscopy.  2. Converted to open right colon resection.    ANESTHESIA:  General.    COMPLICATIONS:  None.    BLOOD LOSS:  30 mL.    INDICATION FOR PROCEDURE:  The patient is a 24-year-old male who presented with a rather sudden onset today of right lower quadrant pain.  CT scan initially read more inflammation than would be expected just in less than 24-hour appendicitis, but he is brought to surgery because of elevated white count and exam with the peritonitis.    FINDINGS:  The appendix on laparoscopy did appear normal.  You could see there was brown fluid along the gutter.  There was an inflammatory mass of small bowel attached to the cecum, and the patient was converted to open.  There was no evidence of Crohn's once he was opened, but there was an inflammatory mass of fat literally adhered to the cecal wall.  Initially, I thought it could be a twisted appendices epiploicae, but it was absolutely again plastered to the cecum.  There was inflammation of the ascending colon, and I elected to do a limited right colon resection.    PROCEDURE DESCRIPTION:  Patient was brought to the operating suite, placed supine on the operating room table.  After adequate inhalational anesthesia was administered, the abdomen was prepped and draped in the usual sterile fashion.  An incision was made below the umbilicus.  A Veress needle was placed, the CO2 was insufflated to  a pressure of 15.  The Veress needle was removed.  The trocar was placed and a camera was placed through the trocar verifying intraabdominal placement of the trocar.  I then placed a 5 mm suprapubic port and the appendix could be seen in the gutter, and I elevated it up and it looked little swollen, but clearly not inflamed and then you could see the small bowel attached to the cecum in an inflammatory process.  I was able to strip some of the small bowel off, it did not look like Crohn's, but there was some sort of inflammatory mass of the cecum.  For this reason, the trocars were removed.  Incision was made in the midline, taken down through the subcutaneous tissue down to the linea alba.  The abdominal cavity was entered.  Now visually, you could see this inflammatory mass in the cecal wall.  It was fat with some pus on it, it did not really look like a twisted appendices epiploica, but there was no way to dissect this off the cecum.  Not knowing exactly the process, I elected to do a right colon resection with obviously the appendix coming attached to the cecum.  AIMEE was fired across the distal ileum.  The mesentery of the distal ileum and right colon was taken down.  We went around the hepatic flexure and we first taken down the peritoneal reflection of the right colon to free it up.  We chose a point just past the hepatic flexure on the transverse colon, fired a AIMEE and then completed the mesenteric clamping and dissection and removed the right colon.  A side-to-side anastomosis was performed with the AIMEE.  Opening closed with a TA60 stapler.  We did reinforce the staple line with Lembert sutures.  I did partially close the mesentery as much as we could.  We irrigated with 400 mL of saline and then 400 mL of IrriSept and then closure was begun.  The fascia was closed with #1 Vicryl sutures.  Skin was closed with staples.  Patient tolerated the procedure well.          DYANA SOUZA MD      D:  11/02/2024

## 2024-11-02 NOTE — BRIEF OP NOTE
Brief Postoperative Note      Patient: Gabino Nowak  YOB: 2000  MRN: [id number]    Date of Procedure: 11/2/2024    Pre-Op Diagnosis Codes:      * Acute appendicitis, unspecified acute appendicitis type [K35.80]    Post-Op Diagnosis: Inflammatory Mass Cecum       Procedure(s):  ATTEMPTED APPENDECTOMY LAPAROSCOPIC, CONVERTED TO OPEN RIGHT COLON RESECTION    Surgeon(s):  Pietro Castro MD    Assistant:      Anesthesia: General    Estimated Blood Loss (mL): 30 ml    Complications: Inability to perform Laparoscopically    Specimens:   ID Type Source Tests Collected by Time Destination   A : Right Colon Tissue Colon SURGICAL PATHOLOGY Pietro Castro MD 11/2/2024 0301        Implants:        Drains:   NG/OG/NJ/NE Tube Nasogastric 18 fr Left nostril (Active)       Findings:  Infection Present At Time Of Surgery (PATOS) (choose all levels that have infection present):  - Organ Space infection (below fascia) present as evidenced by purulent fluid  Other Findings: see op note    Electronically signed by Pietro Castro MD on 11/2/2024 at 3:39 AM

## 2024-11-02 NOTE — PLAN OF CARE
Problem: Discharge Planning  Goal: Discharge to home or other facility with appropriate resources  Outcome: Progressing  Flowsheets (Taken 11/2/2024 0425)  Discharge to home or other facility with appropriate resources:   Identify barriers to discharge with patient and caregiver   Arrange for needed discharge resources and transportation as appropriate   Identify discharge learning needs (meds, wound care, etc)   Refer to discharge planning if patient needs post-hospital services based on physician order or complex needs related to functional status, cognitive ability or social support system     Problem: Pain  Goal: Verbalizes/displays adequate comfort level or baseline comfort level  Outcome: Progressing  Flowsheets (Taken 11/2/2024 0507)  Verbalizes/displays adequate comfort level or baseline comfort level:   Encourage patient to monitor pain and request assistance   Administer analgesics based on type and severity of pain and evaluate response   Assess pain using appropriate pain scale   Implement non-pharmacological measures as appropriate and evaluate response   Notify Licensed Independent Practitioner if interventions unsuccessful or patient reports new pain   Consider cultural and social influences on pain and pain management     Problem: ABCDS Injury Assessment  Goal: Absence of physical injury  Outcome: Progressing  Flowsheets (Taken 11/2/2024 0507)  Absence of Physical Injury: Implement safety measures based on patient assessment   Careplan reviewed with patient. Patient verbalizes understanding of plan of care and contributes towards goals of care.      Patient educated on how to use incentive spirometer. Patient verbalized understanding and demonstrated proper use. Emphasized importance and usage of device, with coughing and deep breathing every 2 hours while awake.

## 2024-11-02 NOTE — PROGRESS NOTES
Patient arrives to the pre-op holding area with  tablet at bedside. Pre-op checklist is performed and consents are obtained by surgeon and anaesthesiologist with use of an . Questions and concerns are addressed with patient.

## 2024-11-02 NOTE — ANESTHESIA POSTPROCEDURE EVALUATION
Department of Anesthesiology  Postprocedure Note    Patient: Gabino Nowak  MRN: [id number]  YOB: 2000  Date of evaluation: 11/2/2024    Procedure Summary       Date: 11/02/24 Room / Location: [facility] OR 03 / [facility] OR    Anesthesia Start: 0156 Anesthesia Stop: 0350    Procedure: ATTEMPTED APPENDECTOMY LAPAROSCOPIC, CONVERTED TO OPEN RIGHT COLON RESECTION (Abdomen) Diagnosis:       Acute appendicitis, unspecified acute appendicitis type      (Acute appendicitis, unspecified acute appendicitis type [K35.80])    Surgeons: Pietro Castro MD Responsible Provider: Odell Chairez DO    Anesthesia Type: general ASA Status: 1 - Emergent            Anesthesia Type: No value filed.    Temi Phase I: Temi Score: 10    Temi Phase II:      Anesthesia Post Evaluation    Patient location during evaluation: floor  Patient participation: complete - patient participated  Level of consciousness: awake  Airway patency: patent  Nausea & Vomiting: no vomiting and no nausea  Cardiovascular status: hemodynamically stable  Respiratory status: acceptable  Hydration status: stable  Comments: Talked to pt.'s care giving nurse and pt.  Pain management: adequate    No notable events documented.

## 2024-11-03 LAB
BASOPHILS ABSOLUTE: 0 THOU/MM3 (ref 0–0.1)
BASOPHILS NFR BLD AUTO: 0.2 %
DEPRECATED RDW RBC AUTO: 43.7 FL (ref 35–45)
EOSINOPHIL NFR BLD AUTO: 0 %
EOSINOPHILS ABSOLUTE: 0 THOU/MM3 (ref 0–0.4)
ERYTHROCYTE [DISTWIDTH] IN BLOOD BY AUTOMATED COUNT: 12.7 % (ref 11.5–14.5)
HCT VFR BLD AUTO: 49.9 % (ref 42–52)
HGB BLD-MCNC: 16.7 GM/DL (ref 14–18)
IMM GRANULOCYTES # BLD AUTO: 0.09 THOU/MM3 (ref 0–0.07)
IMM GRANULOCYTES NFR BLD AUTO: 0.5 %
LYMPHOCYTES ABSOLUTE: 2.2 THOU/MM3 (ref 1–4.8)
LYMPHOCYTES NFR BLD AUTO: 11.8 %
MCH RBC QN AUTO: 31.3 PG (ref 26–33)
MCHC RBC AUTO-ENTMCNC: 33.5 GM/DL (ref 32.2–35.5)
MCV RBC AUTO: 93.4 FL (ref 80–94)
MONOCYTES ABSOLUTE: 1.7 THOU/MM3 (ref 0.4–1.3)
MONOCYTES NFR BLD AUTO: 9.5 %
NEUTROPHILS ABSOLUTE: 14.4 THOU/MM3 (ref 1.8–7.7)
NEUTROPHILS NFR BLD AUTO: 78 %
NRBC BLD AUTO-RTO: 0 /100 WBC
PLATELET # BLD AUTO: 211 THOU/MM3 (ref 130–400)
PMV BLD AUTO: 11.3 FL (ref 9.4–12.4)
RBC # BLD AUTO: 5.34 MILL/MM3 (ref 4.7–6.1)
WBC # BLD AUTO: 18.4 THOU/MM3 (ref 4.8–10.8)

## 2024-11-03 PROCEDURE — 6360000002 HC RX W HCPCS: Performed by: SURGERY

## 2024-11-03 PROCEDURE — 2580000003 HC RX 258: Performed by: SURGERY

## 2024-11-03 PROCEDURE — 36415 COLL VENOUS BLD VENIPUNCTURE: CPT

## 2024-11-03 PROCEDURE — 1200000000 HC SEMI PRIVATE

## 2024-11-03 PROCEDURE — 85025 COMPLETE CBC W/AUTO DIFF WBC: CPT

## 2024-11-03 RX ADMIN — PIPERACILLIN AND TAZOBACTAM 3375 MG: 3; .375 INJECTION, POWDER, FOR SOLUTION INTRAVENOUS at 23:08

## 2024-11-03 RX ADMIN — SODIUM CHLORIDE: 9 INJECTION, SOLUTION INTRAVENOUS at 03:48

## 2024-11-03 RX ADMIN — HYDROMORPHONE HYDROCHLORIDE 1 MG: 1 INJECTION, SOLUTION INTRAMUSCULAR; INTRAVENOUS; SUBCUTANEOUS at 23:20

## 2024-11-03 RX ADMIN — ONDANSETRON 4 MG: 2 INJECTION INTRAMUSCULAR; INTRAVENOUS at 23:20

## 2024-11-03 RX ADMIN — SODIUM CHLORIDE: 9 INJECTION, SOLUTION INTRAVENOUS at 11:52

## 2024-11-03 RX ADMIN — SODIUM CHLORIDE: 9 INJECTION, SOLUTION INTRAVENOUS at 20:17

## 2024-11-03 RX ADMIN — PIPERACILLIN AND TAZOBACTAM 3375 MG: 3; .375 INJECTION, POWDER, FOR SOLUTION INTRAVENOUS at 14:58

## 2024-11-03 RX ADMIN — PIPERACILLIN AND TAZOBACTAM 3375 MG: 3; .375 INJECTION, POWDER, FOR SOLUTION INTRAVENOUS at 06:33

## 2024-11-03 ASSESSMENT — PAIN SCALES - WONG BAKER
WONGBAKER_NUMERICALRESPONSE: HURTS A LITTLE BIT

## 2024-11-03 ASSESSMENT — PAIN SCALES - GENERAL
PAINLEVEL_OUTOF10: 6
PAINLEVEL_OUTOF10: 3
PAINLEVEL_OUTOF10: 2

## 2024-11-03 ASSESSMENT — PAIN DESCRIPTION - FREQUENCY
FREQUENCY: CONTINUOUS
FREQUENCY: CONTINUOUS

## 2024-11-03 ASSESSMENT — PAIN DESCRIPTION - LOCATION
LOCATION: ABDOMEN
LOCATION: ABDOMEN

## 2024-11-03 ASSESSMENT — PAIN DESCRIPTION - PAIN TYPE
TYPE: SURGICAL PAIN
TYPE: SURGICAL PAIN

## 2024-11-03 ASSESSMENT — PAIN - FUNCTIONAL ASSESSMENT
PAIN_FUNCTIONAL_ASSESSMENT: ACTIVITIES ARE NOT PREVENTED
PAIN_FUNCTIONAL_ASSESSMENT: ACTIVITIES ARE NOT PREVENTED

## 2024-11-03 ASSESSMENT — PAIN DESCRIPTION - DESCRIPTORS
DESCRIPTORS: ACHING;SORE
DESCRIPTORS: ACHING;SORE

## 2024-11-03 ASSESSMENT — PAIN DESCRIPTION - ORIENTATION
ORIENTATION: MID
ORIENTATION: MID

## 2024-11-03 NOTE — PLAN OF CARE
Problem: Discharge Planning  Goal: Discharge to home or other facility with appropriate resources  11/2/2024 2045 by Adama Hsu, RN  Outcome: Progressing  Flowsheets (Taken 11/2/2024 2040)  Discharge to home or other facility with appropriate resources:   Identify barriers to discharge with patient and caregiver   Arrange for needed discharge resources and transportation as appropriate   Identify discharge learning needs (meds, wound care, etc)   Refer to discharge planning if patient needs post-hospital services based on physician order or complex needs related to functional status, cognitive ability or social support system  11/2/2024 1229 by Eboni Mendoza, RN  Outcome: Progressing  Flowsheets (Taken 11/2/2024 0425 by Adama Hsu, RN)  Discharge to home or other facility with appropriate resources:   Identify barriers to discharge with patient and caregiver   Arrange for needed discharge resources and transportation as appropriate   Identify discharge learning needs (meds, wound care, etc)   Refer to discharge planning if patient needs post-hospital services based on physician order or complex needs related to functional status, cognitive ability or social support system     Problem: Pain  Goal: Verbalizes/displays adequate comfort level or baseline comfort level  11/2/2024 2045 by Adama Hsu, RN  Outcome: Progressing  Flowsheets (Taken 11/2/2024 0507)  Verbalizes/displays adequate comfort level or baseline comfort level:   Encourage patient to monitor pain and request assistance   Administer analgesics based on type and severity of pain and evaluate response   Assess pain using appropriate pain scale   Implement non-pharmacological measures as appropriate and evaluate response   Notify Licensed Independent Practitioner if interventions unsuccessful or patient reports new pain   Consider cultural and social influences on pain and pain management  11/2/2024 1229 by Eboni Mendoza,

## 2024-11-03 NOTE — PLAN OF CARE
Problem: Discharge Planning  Goal: Discharge to home or other facility with appropriate resources  Outcome: Progressing  Flowsheets (Taken 11/2/2024 2040 by Adama Hsu, RN)  Discharge to home or other facility with appropriate resources:   Identify barriers to discharge with patient and caregiver   Arrange for needed discharge resources and transportation as appropriate   Identify discharge learning needs (meds, wound care, etc)   Refer to discharge planning if patient needs post-hospital services based on physician order or complex needs related to functional status, cognitive ability or social support system     Problem: Pain  Goal: Verbalizes/displays adequate comfort level or baseline comfort level  Outcome: Progressing  Flowsheets (Taken 11/2/2024 0507 by Adama Hsu, RN)  Verbalizes/displays adequate comfort level or baseline comfort level:   Encourage patient to monitor pain and request assistance   Administer analgesics based on type and severity of pain and evaluate response   Assess pain using appropriate pain scale   Implement non-pharmacological measures as appropriate and evaluate response   Notify Licensed Independent Practitioner if interventions unsuccessful or patient reports new pain   Consider cultural and social influences on pain and pain management     Problem: ABCDS Injury Assessment  Goal: Absence of physical injury  Outcome: Progressing  Flowsheets (Taken 11/2/2024 0507 by Adama Hsu, RN)  Absence of Physical Injury: Implement safety measures based on patient assessment     Problem: Safety - Adult  Goal: Free from fall injury  Outcome: Progressing  Flowsheets (Taken 11/2/2024 1229)  Free From Fall Injury: Instruct family/caregiver on patient safety   Patient stated understanding of the above care plan.

## 2024-11-03 NOTE — FLOWSHEET NOTE
11/02/24 2101   Treatment Team Notification   Reason for Communication Patient/Family request   Name of Team Member Notified Britney Perez   Treatment Team Role Physician Assistant   Method of Communication Secure Message   Response See orders   Notification Time 2101     Notified PA at this time per patient request due to increased nausea unrelieved by PRN Zofran. PA placed orders for PRN phenergan at this time.

## 2024-11-04 ENCOUNTER — APPOINTMENT (OUTPATIENT)
Dept: GENERAL RADIOLOGY | Age: 24
End: 2024-11-04
Payer: MEDICAID

## 2024-11-04 PROCEDURE — 6360000002 HC RX W HCPCS: Performed by: OCCUPATIONAL THERAPIST

## 2024-11-04 PROCEDURE — 2580000003 HC RX 258: Performed by: SURGERY

## 2024-11-04 PROCEDURE — 74018 RADEX ABDOMEN 1 VIEW: CPT

## 2024-11-04 PROCEDURE — 6360000002 HC RX W HCPCS: Performed by: SURGERY

## 2024-11-04 PROCEDURE — 6370000000 HC RX 637 (ALT 250 FOR IP): Performed by: SURGERY

## 2024-11-04 PROCEDURE — 1200000000 HC SEMI PRIVATE

## 2024-11-04 RX ORDER — SCOLOPAMINE TRANSDERMAL SYSTEM 1 MG/1
1 PATCH, EXTENDED RELEASE TRANSDERMAL
Status: DISCONTINUED | OUTPATIENT
Start: 2024-11-04 | End: 2024-11-08 | Stop reason: HOSPADM

## 2024-11-04 RX ADMIN — ONDANSETRON 4 MG: 2 INJECTION INTRAMUSCULAR; INTRAVENOUS at 12:46

## 2024-11-04 RX ADMIN — ONDANSETRON 4 MG: 2 INJECTION INTRAMUSCULAR; INTRAVENOUS at 19:22

## 2024-11-04 RX ADMIN — PROMETHAZINE HYDROCHLORIDE 6.25 MG: 25 INJECTION INTRAMUSCULAR; INTRAVENOUS at 11:06

## 2024-11-04 RX ADMIN — ONDANSETRON 4 MG: 2 INJECTION INTRAMUSCULAR; INTRAVENOUS at 06:02

## 2024-11-04 RX ADMIN — HYDROMORPHONE HYDROCHLORIDE 1 MG: 1 INJECTION, SOLUTION INTRAMUSCULAR; INTRAVENOUS; SUBCUTANEOUS at 06:43

## 2024-11-04 RX ADMIN — PIPERACILLIN AND TAZOBACTAM 3375 MG: 3; .375 INJECTION, POWDER, FOR SOLUTION INTRAVENOUS at 06:42

## 2024-11-04 RX ADMIN — PIPERACILLIN AND TAZOBACTAM 3375 MG: 3; .375 INJECTION, POWDER, FOR SOLUTION INTRAVENOUS at 14:58

## 2024-11-04 RX ADMIN — PIPERACILLIN AND TAZOBACTAM 3375 MG: 3; .375 INJECTION, POWDER, FOR SOLUTION INTRAVENOUS at 23:00

## 2024-11-04 RX ADMIN — SODIUM CHLORIDE: 9 INJECTION, SOLUTION INTRAVENOUS at 20:41

## 2024-11-04 RX ADMIN — SODIUM CHLORIDE: 9 INJECTION, SOLUTION INTRAVENOUS at 04:43

## 2024-11-04 ASSESSMENT — PAIN SCALES - GENERAL
PAINLEVEL_OUTOF10: 7
PAINLEVEL_OUTOF10: 0

## 2024-11-04 NOTE — PLAN OF CARE
Problem: Discharge Planning  Goal: Discharge to home or other facility with appropriate resources  11/4/2024 0855 by Anita Becker RN  Outcome: Progressing  Flowsheets (Taken 11/4/2024 0855)  Discharge to home or other facility with appropriate resources:   Identify barriers to discharge with patient and caregiver   Identify discharge learning needs (meds, wound care, etc)   Arrange for needed discharge resources and transportation as appropriate   Arrange for interpreters to assist at discharge as needed     Problem: Pain  Goal: Verbalizes/displays adequate comfort level or baseline comfort level  11/4/2024 0855 by Anita Becker RN  Outcome: Progressing  Flowsheets (Taken 11/4/2024 0855)  Verbalizes/displays adequate comfort level or baseline comfort level:   Encourage patient to monitor pain and request assistance   Assess pain using appropriate pain scale   Implement non-pharmacological measures as appropriate and evaluate response   Administer analgesics based on type and severity of pain and evaluate response     Problem: ABCDS Injury Assessment  Goal: Absence of physical injury  11/4/2024 0855 by Anita Becker RN  Outcome: Progressing  Flowsheets (Taken 11/4/2024 0855)  Absence of Physical Injury: Implement safety measures based on patient assessment     Problem: Safety - Adult  Goal: Free from fall injury  11/4/2024 0855 by Anita Becker RN  Outcome: Progressing  Flowsheets (Taken 11/4/2024 0855)  Free From Fall Injury:   Instruct family/caregiver on patient safety   Based on caregiver fall risk screen, instruct family/caregiver to ask for assistance with transferring infant if caregiver noted to have fall risk factors   Care plan reviewed with Pt. Pt verbalizes understanding of plan of care and contributes to goal setting.

## 2024-11-04 NOTE — PROGRESS NOTES
Surg POD#3pt on cl liq had emesis x 2 abd really soft very minimal distention few BS ck KUB likely mild ileus  yumiko CBC in AM     Addendum KUB as noted below shows ileus as expected patient also had another emesis will make n.p.o. continue Zosyn exam this morning showed no evidence of anastomotic leak but will follow await pathology findings at surgery again coordinated with CT findings of significant inflammation of the cecum and ascending colon and ileum including the base of the appendix but again this was not appendicitis  ROCEDURE: XR ABDOMEN (KUB) (SINGLE AP VIEW)     CLINICAL INFORMATION: s/p ileocecectomy     COMPARISON: No comparison available.     TECHNIQUE: 2 supine images of the abdomen were obtained.     FINDINGS:  There is moderate gas distention of multiple small bowel loops following recent  surgery in addition to portions of the colon. Multiple skin staples are present  from recent surgery. Kidneys are somewhat obscured.. No definite renal or  ureteral calculi are seen..     IMPRESSION:  Moderate postop ileus.           **This report has been created using voice recognition software.  It may contain  minor errors which are inherent in voice recognition technology.**              Electronically signed by Dr. Alpesh Mathis

## 2024-11-04 NOTE — H&P
[place] [id number]                            PREOPERATIVE H&P      PATIENT NAME: BRIAN ROSALES           : 2000  MED REC NO: [id number]                       ROOM: 6A09  ACCOUNT NO: [id number]                       ADMIT DATE: 2024  PROVIDER: Pietro Castro MD      CHIEF COMPLAINT:  Possible appendicitis.    HISTORY OF PRESENT ILLNESS:  The patient is a 24-year-old [place] male who does not speak English.  We talked with him via an , but apparently he is otherwise healthy.  No prior surgeries or medical issues, who apparently woke up this morning with right-sided abdominal pain.  He presented to our emergency room, was noted to have a white count of 18,000 and a CT scan being read as high-grade inflammatory process involving the cecum and proximal right colon as well as terminal ileum and to a lesser extent the proximal appendix.  It was uncertain that this was appendicitis, but he had peritoneal irritation and surgical consultation has been requested.    PAST MEDICAL HISTORY:  None.    MEDICAL ILLNESSES:  None.    PAST SURGICAL HISTORY:  None.    MEDICATIONS:  None.    SOCIAL HISTORY:  He is a nonsmoker.  Social alcohol user.    REVIEW OF SYSTEMS:  Ten-point review of systems is negative.    FAMILY HISTORY:  Unknown.    PHYSICAL EXAMINATION:  GENERAL:  The patient is a 24-year-old male, appears his age.  HEAD, EARS, EYES, NOSE, AND THROAT:  Show no scleral icterus.  CARDIOVASCULAR:  S1, S2.  RESPIRATIONS:  Clear.  ABDOMEN:  Somewhat obese.  Bowel sounds are present, but diminished.  Left side of the abdomen is negative.  Right side of the abdomen shows particularly over McBurney's point rebound tenderness.  Groins are negative.  EXTREMITIES:  The upper and lower extremities are within normal limits.    LABORATORY DATA:  Revealed a sodium of 137, potassium of 3.9, BUN of 10, and creatinine of 0.9.  Liver

## 2024-11-04 NOTE — PLAN OF CARE
Problem: Discharge Planning  Goal: Discharge to home or other facility with appropriate resources  11/3/2024 2006 by Alyssa Pryor RN  Outcome: Progressing  Flowsheets (Taken 11/3/2024 2006)  Discharge to home or other facility with appropriate resources:   Identify barriers to discharge with patient and caregiver   Identify discharge learning needs (meds, wound care, etc)   Arrange for needed discharge resources and transportation as appropriate     Problem: Pain  Goal: Verbalizes/displays adequate comfort level or baseline comfort level  11/3/2024 2006 by Alyssa Pryor RN  Outcome: Progressing  Flowsheets (Taken 11/3/2024 2006)  Verbalizes/displays adequate comfort level or baseline comfort level:   Encourage patient to monitor pain and request assistance   Administer analgesics based on type and severity of pain and evaluate response   Assess pain using appropriate pain scale   Implement non-pharmacological measures as appropriate and evaluate response     Problem: ABCDS Injury Assessment  Goal: Absence of physical injury  11/3/2024 2006 by Alyssa Pryor, RN  Outcome: Progressing  Flowsheets (Taken 11/3/2024 2006)  Absence of Physical Injury: Implement safety measures based on patient assessment     Problem: Safety - Adult  Goal: Free from fall injury  11/3/2024 2006 by Alyssa Pryor, RN  Outcome: Progressing  Flowsheets (Taken 11/3/2024 2006)  Free From Fall Injury: Instruct family/caregiver on patient safety   Care plan reviewed with patient. patient verbalize understanding of plan of care and contribute to goal setting.

## 2024-11-05 LAB
DEPRECATED RDW RBC AUTO: 43.8 FL (ref 35–45)
ERYTHROCYTE [DISTWIDTH] IN BLOOD BY AUTOMATED COUNT: 12.4 % (ref 11.5–14.5)
HCT VFR BLD AUTO: 47.9 % (ref 42–52)
HGB BLD-MCNC: 15.8 GM/DL (ref 14–18)
MCH RBC QN AUTO: 31.5 PG (ref 26–33)
MCHC RBC AUTO-ENTMCNC: 33 GM/DL (ref 32.2–35.5)
MCV RBC AUTO: 95.6 FL (ref 80–94)
PLATELET # BLD AUTO: 241 THOU/MM3 (ref 130–400)
PMV BLD AUTO: 11 FL (ref 9.4–12.4)
RBC # BLD AUTO: 5.01 MILL/MM3 (ref 4.7–6.1)
WBC # BLD AUTO: 11.9 THOU/MM3 (ref 4.8–10.8)

## 2024-11-05 PROCEDURE — 2580000003 HC RX 258: Performed by: SURGERY

## 2024-11-05 PROCEDURE — 6360000002 HC RX W HCPCS: Performed by: SURGERY

## 2024-11-05 PROCEDURE — 36415 COLL VENOUS BLD VENIPUNCTURE: CPT

## 2024-11-05 PROCEDURE — 1200000000 HC SEMI PRIVATE

## 2024-11-05 PROCEDURE — 85027 COMPLETE CBC AUTOMATED: CPT

## 2024-11-05 RX ADMIN — PIPERACILLIN AND TAZOBACTAM 3375 MG: 3; .375 INJECTION, POWDER, FOR SOLUTION INTRAVENOUS at 06:31

## 2024-11-05 RX ADMIN — PIPERACILLIN AND TAZOBACTAM 3375 MG: 3; .375 INJECTION, POWDER, FOR SOLUTION INTRAVENOUS at 23:02

## 2024-11-05 RX ADMIN — PIPERACILLIN AND TAZOBACTAM 3375 MG: 3; .375 INJECTION, POWDER, FOR SOLUTION INTRAVENOUS at 14:35

## 2024-11-05 RX ADMIN — SODIUM CHLORIDE: 9 INJECTION, SOLUTION INTRAVENOUS at 04:43

## 2024-11-05 RX ADMIN — SODIUM CHLORIDE: 9 INJECTION, SOLUTION INTRAVENOUS at 21:06

## 2024-11-05 RX ADMIN — SODIUM CHLORIDE: 9 INJECTION, SOLUTION INTRAVENOUS at 12:46

## 2024-11-05 ASSESSMENT — PAIN SCALES - GENERAL: PAINLEVEL_OUTOF10: 0

## 2024-11-05 NOTE — PLAN OF CARE
Problem: Discharge Planning  Goal: Discharge to home or other facility with appropriate resources  11/4/2024 2206 by Alyssa Pryor RN  Outcome: Progressing  Flowsheets (Taken 11/4/2024 2206)  Discharge to home or other facility with appropriate resources:   Identify barriers to discharge with patient and caregiver   Identify discharge learning needs (meds, wound care, etc)   Arrange for needed discharge resources and transportation as appropriate     Problem: Pain  Goal: Verbalizes/displays adequate comfort level or baseline comfort level  11/4/2024 2206 by Alyssa Pryor RN  Outcome: Progressing  Flowsheets (Taken 11/4/2024 2206)  Verbalizes/displays adequate comfort level or baseline comfort level:   Encourage patient to monitor pain and request assistance   Administer analgesics based on type and severity of pain and evaluate response   Assess pain using appropriate pain scale   Implement non-pharmacological measures as appropriate and evaluate response     Problem: ABCDS Injury Assessment  Goal: Absence of physical injury  11/4/2024 2206 by Alyssa Pryor RN  Outcome: Progressing  Flowsheets (Taken 11/4/2024 2206)  Absence of Physical Injury: Implement safety measures based on patient assessment     Problem: Safety - Adult  Goal: Free from fall injury  11/4/2024 2206 by Alyssa Pryor RN  Outcome: Progressing  Flowsheets (Taken 11/4/2024 2206)  Free From Fall Injury: Instruct family/caregiver on patient safety   Care plan reviewed with patient. patient verbalize understanding of plan of care and contribute to goal setting.

## 2024-11-05 NOTE — PLAN OF CARE
Problem: Discharge Planning  Goal: Discharge to home or other facility with appropriate resources  Outcome: Progressing  Flowsheets (Taken 11/4/2024 2206 by Alyssa Pryor, RN)  Discharge to home or other facility with appropriate resources:   Identify barriers to discharge with patient and caregiver   Identify discharge learning needs (meds, wound care, etc)   Arrange for needed discharge resources and transportation as appropriate     Problem: Pain  Goal: Verbalizes/displays adequate comfort level or baseline comfort level  Outcome: Progressing  Flowsheets (Taken 11/4/2024 2206 by Alyssa Pryor, RN)  Verbalizes/displays adequate comfort level or baseline comfort level:   Encourage patient to monitor pain and request assistance   Administer analgesics based on type and severity of pain and evaluate response   Assess pain using appropriate pain scale   Implement non-pharmacological measures as appropriate and evaluate response     Problem: ABCDS Injury Assessment  Goal: Absence of physical injury  Outcome: Progressing  Flowsheets (Taken 11/4/2024 2206 by Alyssa Pryor, RN)  Absence of Physical Injury: Implement safety measures based on patient assessment     Problem: Safety - Adult  Goal: Free from fall injury  Outcome: Progressing  Flowsheets (Taken 11/4/2024 2206 by Alyssa Pryor, RN)  Free From Fall Injury: Instruct family/caregiver on patient safety   Patient stated understanding of the above care plan.

## 2024-11-05 NOTE — PROGRESS NOTES
[facility] [place]   Dr. Pietro Castro MD  Daily Progress Note  Pt Name: Gabino Nowak  Medical Record Number: [id number]  Date of Birth 2000   Today's Date: 11/5/2024    POD# 4    CHIEF COMPLAINTStatus post laparoscopy converted to right colon resection  Encounter was using the portable   SUBJECTIVE  Patient Denies nausea today    OBJECTIVE  CURRENT VITALS /64   Pulse 84   Temp 98 °F (36.7 °C) (Oral)   Resp 16   Wt 81.6 kg (180 lb)   SpO2 98%   LUNGS: Lungs clear   ABDOMEN: Soft bowel sounds are presentNo real pain to palpation  WOUNDS: Staple line intact  24 HR INTAKE/OUTPUT :   Intake/Output Summary (Last 24 hours) at 11/5/2024 1005  Last data filed at 11/5/2024 0300  Gross per 24 hour   Intake 2397.51 ml   Output 451 ml   Net 1946.51 ml     DRAIN/TUBE OUTPUT :      LABS  CBC :   Lab Results   Component Value Date/Time    WBC 11.9 11/05/2024 06:28 AM    HGB 15.8 11/05/2024 06:28 AM    HCT 47.9 11/05/2024 06:28 AM     11/05/2024 06:28 AM     BMP:   Lab Results   Component Value Date/Time     11/01/2024 09:56 PM    K 3.9 11/01/2024 09:56 PM     11/01/2024 09:56 PM    CO2 24 11/01/2024 09:56 PM    BUN 10 11/01/2024 09:56 PM    CREATININE 0.9 11/01/2024 09:56 PM       ASSESSMENT  1. Staple line intact no drainage pathology is still pending as of this morningWhite blood cell count has come down to near normal at 11.9Would continue the Zosyn until white count normal will recheck tomorrow.Also discussed with patient apparently sister had come in yesterday also had had an appendectomy and apparently was concerned that something had gone wrong during surgeryFrom the standpoint that he now has an incisionWe discussed with the patient via again the  that the appendix appeared normal the problem was with the bowel and the bowel wallEven when the review of the CT scan CT scan again showed significant inflammation of the small bowel and circumferential  inflammation of the colon which also would be unusual for appendicitisNonetheless we discussed the white blood cell count is coming down nicely he has on antibioticsWe will restart clear liquidsKUB yesterday showed a mild ileus as would not be too surprisingClinically he is better will begin clear liquids recheck white blood cell count tomorrow continue antibiotics      PLAN  1. As above      Pietro Castro MD  Electronically signed 11/5/2024 at 10:05 AM

## 2024-11-05 NOTE — PROGRESS NOTES
Pt was in bed and alone. He does not speak English. We used sign language. He was blessed.    11/05/24 1332   Encounter Summary   Encounter Overview/Reason Initial Encounter   Service Provided For Patient   Referral/Consult From Rounding   Support System Unknown   Last Encounter  11/05/24   Complexity of Encounter Low   Spiritual/Emotional needs   Type Spiritual Support   Assessment/Intervention/Outcome   Assessment Other (Comment)

## 2024-11-05 NOTE — CARE COORDINATION
Case Management Assessment Initial Evaluation    Date/Time of Evaluation: 2024 11:35 AM  Assessment Completed by: Eboni Lauren RN    If patient is discharged prior to next notation, then this note serves as note for discharge by case management.    Patient Name: Gabino Nowak                   YOB: 2000  Diagnosis: Acute appendicitis with generalized peritonitis, unspecified whether abscess present, unspecified whether gangrene present, unspecified whether perforation present [K35.209]  Status post appendectomy [Z90.49]  Cecum mass [K63.89]                   Date / Time: 2024  9:29 PM  Location: [facility]     Patient Admission Status: Inpatient   Readmission Risk Low 0-14, Mod 15-19), High > 20: Readmission Risk Score: 3.9    Current PCP: No primary care provider on file.  Health Care Decision Makers:     Additional Case Management Notes: From ED, WBC 11.9, pain and nausea control, clear liquid diet, IV fluids, IV Zosyn.    Procedures:    ATTEMPTED APPENDECTOMY LAPAROSCOPIC, CONVERTED TO OPEN RIGHT COLON RESECTION     Imagin/1 CT Abdomen Pelvis W IV Contrast High-grade inflammatory process involving the cecum and proximal right   colon as well as terminal ileum into lesser extent proximal appendix. See   above for discussion. General surgery consultation recommended.      KUB Moderate postop ileus     Patient Goals/Plan/Treatment Preferences: Met with Gabino. He currently lives at home with his wife. He is independent. Plan is to return home at discharge. He denies need for DME and declines HH. He is agreeable to [facility] for PCP. Per Cherry at [employer], encouraged pt to get [place] Medicaid switched to [place]. Pt verbalizes understanding.This interaction was done with  line.        [phone]   Service Assessment   Patient Orientation Alert and Oriented   Cognition Alert   History Provided By Patient   Primary Caregiver Self    Support Systems Spouse/Significant Other   Patient's Healthcare Decision Maker is: Patient Declined (Legal Next of Kin Remains as Decision Maker)   PCP Verified by CM Yes  (agreeable to residents [facility])   Last Visit to PCP Within last two years   Prior Functional Level Independent in ADLs/IADLs   Current Functional Level Independent in ADLs/IADLs   Can patient return to prior living arrangement Yes   Ability to make needs known: Good   Family able to assist with home care needs: Yes   Would you like for me to discuss the discharge plan with any other family members/significant others, and if so, who? No   Financial Resources Medicaid   Community Resources None   Social/Functional History   Home Equipment None   Active  Yes   Discharge Planning   Type of Residence House   Living Arrangements Spouse/Significant Other   Current Services Prior To Admission None   Potential Assistance Needed N/A   DME Ordered? No   Potential Assistance Purchasing Medications No   Type of Home Care Services None   Patient expects to be discharged to: House   Services At/After Discharge   Confirm Follow Up Transport Self   Condition of Participation: Discharge Planning   The Plan for Transition of Care is related to the following treatment goals: to go home

## 2024-11-06 LAB
DEPRECATED RDW RBC AUTO: 43.4 FL (ref 35–45)
ERYTHROCYTE [DISTWIDTH] IN BLOOD BY AUTOMATED COUNT: 12.3 % (ref 11.5–14.5)
HCT VFR BLD AUTO: 45 % (ref 42–52)
HGB BLD-MCNC: 14.9 GM/DL (ref 14–18)
MCH RBC QN AUTO: 31.7 PG (ref 26–33)
MCHC RBC AUTO-ENTMCNC: 33.1 GM/DL (ref 32.2–35.5)
MCV RBC AUTO: 95.7 FL (ref 80–94)
PLATELET # BLD AUTO: 231 THOU/MM3 (ref 130–400)
PMV BLD AUTO: 10.9 FL (ref 9.4–12.4)
RBC # BLD AUTO: 4.7 MILL/MM3 (ref 4.7–6.1)
WBC # BLD AUTO: 10.4 THOU/MM3 (ref 4.8–10.8)

## 2024-11-06 PROCEDURE — 6360000002 HC RX W HCPCS: Performed by: SURGERY

## 2024-11-06 PROCEDURE — 2580000003 HC RX 258: Performed by: SURGERY

## 2024-11-06 PROCEDURE — 85027 COMPLETE CBC AUTOMATED: CPT

## 2024-11-06 PROCEDURE — 36415 COLL VENOUS BLD VENIPUNCTURE: CPT

## 2024-11-06 PROCEDURE — 1200000000 HC SEMI PRIVATE

## 2024-11-06 RX ADMIN — PIPERACILLIN AND TAZOBACTAM 3375 MG: 3; .375 INJECTION, POWDER, FOR SOLUTION INTRAVENOUS at 23:58

## 2024-11-06 RX ADMIN — SODIUM CHLORIDE: 9 INJECTION, SOLUTION INTRAVENOUS at 05:08

## 2024-11-06 RX ADMIN — SODIUM CHLORIDE: 9 INJECTION, SOLUTION INTRAVENOUS at 12:55

## 2024-11-06 RX ADMIN — SODIUM CHLORIDE: 9 INJECTION, SOLUTION INTRAVENOUS at 23:59

## 2024-11-06 RX ADMIN — PIPERACILLIN AND TAZOBACTAM 3375 MG: 3; .375 INJECTION, POWDER, FOR SOLUTION INTRAVENOUS at 06:31

## 2024-11-06 RX ADMIN — PIPERACILLIN AND TAZOBACTAM 3375 MG: 3; .375 INJECTION, POWDER, FOR SOLUTION INTRAVENOUS at 15:20

## 2024-11-06 ASSESSMENT — PAIN SCALES - GENERAL: PAINLEVEL_OUTOF10: 0

## 2024-11-06 NOTE — PLAN OF CARE
Problem: Discharge Planning  Goal: Discharge to home or other facility with appropriate resources  11/6/2024 0949 by Sturgeon, Cara B, RN  Outcome: Progressing  Flowsheets (Taken 11/6/2024 0949)  Discharge to home or other facility with appropriate resources:   Identify barriers to discharge with patient and caregiver   Identify discharge learning needs (meds, wound care, etc)     Problem: Pain  Goal: Verbalizes/displays adequate comfort level or baseline comfort level  11/6/2024 0949 by Sturgeon, Cara B, RN  Outcome: Progressing  Flowsheets (Taken 11/6/2024 0949)  Verbalizes/displays adequate comfort level or baseline comfort level:   Encourage patient to monitor pain and request assistance   Assess pain using appropriate pain scale   Administer analgesics based on type and severity of pain and evaluate response   Implement non-pharmacological measures as appropriate and evaluate response     Problem: ABCDS Injury Assessment  Goal: Absence of physical injury  11/6/2024 0949 by Sturgeon, Cara B, RN  Outcome: Progressing  Flowsheets (Taken 11/6/2024 0949)  Absence of Physical Injury: Implement safety measures based on patient assessment     Problem: Safety - Adult  Goal: Free from fall injury  11/6/2024 0949 by Sturgeon, Cara B, RN  Outcome: Progressing  Flowsheets (Taken 11/6/2024 0949)  Free From Fall Injury: Instruct family/caregiver on patient safety     Problem: Gastrointestinal - Adult  Goal: Minimal or absence of nausea and vomiting  Outcome: Progressing  Flowsheets (Taken 11/6/2024 0949)  Minimal or absence of nausea and vomiting:   Administer IV fluids as ordered to ensure adequate hydration   Administer ordered antiemetic medications as needed   Provide nonpharmacologic comfort measures as appropriate     Problem: Gastrointestinal - Adult  Goal: Maintains or returns to baseline bowel function  Outcome: Progressing  Flowsheets (Taken 11/6/2024 0949)  Maintains or returns to baseline bowel function:

## 2024-11-06 NOTE — PROGRESS NOTES
Cognitive Concerns/ Orientation : Disoriented to time and situation  BEHAVIOR & AGGRESSION TOOL COLOR: Green  CIWA SCORE: N/a    ABNL VS/O2: Soft BP 90s/50s, other VSS on RA  MOBILITY: Assist x 1, GB and walker  PAIN MANAGMENT: Denies  DIET: Mod CHO; good intake  BOWEL/BLADDER: Incontinent of bladder  ABNL LAB/BG: , 132  DRAIN/DEVICES: None  TELEMETRY RHYTHM: N/a  SKIN: Wound to LLE, dressing CDI. Mepilex to coccyx for protection  TESTS/PROCEDURES: N/a  D/C DAY/GOALS/PLACE: Posible discharge today. COVID-19 results came back Negative per [facility]  OTHER IMPORTANT INFO: Contact and modified droplet precautions discontinued.   [facility] [place]   Dr. Pietro Castro MD  Daily Progress Note  Pt Name: Gabino Nowak  Medical Record Number: [id number]  Date of Birth 2000   Today's Date: 11/6/2024    POD# 5    CHIEF COMPLAINTStatus post laparoscopy with conversion to right colon resectionPathology has come back showing acute diverticulitis of the cecum    SUBJECTIVE  Patient feels Better is smiling no nausea    OBJECTIVE  CURRENT VITALS /61   Pulse 70   Temp 98.1 °F (36.7 °C) (Oral)   Resp 16   Wt 81.6 kg (180 lb)   SpO2 98%   LUNGS: Lungs clear   ABDOMEN: Soft bowel sounds are present but still somewhat slowNo real pain to palpation  WOUNDS: Staple line intact looks good  24 HR INTAKE/OUTPUT :   Intake/Output Summary (Last 24 hours) at 11/6/2024 1603  Last data filed at 11/6/2024 1254  Gross per 24 hour   Intake 6761.1 ml   Output 1000 ml   Net 5761.1 ml     DRAIN/TUBE OUTPUT :      LABS  CBC :   Lab Results   Component Value Date/Time    WBC 10.4 11/06/2024 06:33 AM    HGB 14.9 11/06/2024 06:33 AM    HCT 45.0 11/06/2024 06:33 AM     11/06/2024 06:33 AM     BMP:   Lab Results   Component Value Date/Time     11/01/2024 09:56 PM    K 3.9 11/01/2024 09:56 PM     11/01/2024 09:56 PM    CO2 24 11/01/2024 09:56 PM    BUN 10 11/01/2024 09:56 PM    CREATININE 0.9 11/01/2024 09:56 PM   INAL DIAGNOSIS:   Terminal ileum, cecum, appendix and ascending colon, right   hemicolectomy:    Diverticulosis with associated diverticulitis and acute serositis.     Specimen:   RIGHT COLON       Gross Examination:     The specimen container is labeled Gabino Nowak, right colon.  The   specimen is received in formalin and consists of a right colon specimen   consisting of the terminal ileum, cecum, appendix, and ascending colon.    The specimen has two stapled margins.  The portion of terminal ileum   measures 9.5 cm in length x 2.5 cm in average diameter.  The portion of   cecum measures 8.5 cm in length x 7.0

## 2024-11-06 NOTE — PLAN OF CARE
Problem: Discharge Planning  Goal: Discharge to home or other facility with appropriate resources  11/5/2024 2216 by Alyssa Pryor RN  Outcome: Progressing  Flowsheets (Taken 11/5/2024 2216)  Discharge to home or other facility with appropriate resources:   Identify barriers to discharge with patient and caregiver   Identify discharge learning needs (meds, wound care, etc)   Arrange for needed discharge resources and transportation as appropriate     Problem: Pain  Goal: Verbalizes/displays adequate comfort level or baseline comfort level  11/5/2024 2216 by Alyssa Pryor RN  Outcome: Progressing  Flowsheets (Taken 11/5/2024 2216)  Verbalizes/displays adequate comfort level or baseline comfort level:   Administer analgesics based on type and severity of pain and evaluate response   Encourage patient to monitor pain and request assistance   Implement non-pharmacological measures as appropriate and evaluate response   Assess pain using appropriate pain scale     Problem: ABCDS Injury Assessment  Goal: Absence of physical injury  11/5/2024 2216 by Alyssa Pryor RN  Outcome: Progressing  Flowsheets (Taken 11/5/2024 2216)  Absence of Physical Injury: Implement safety measures based on patient assessment     Problem: Safety - Adult  Goal: Free from fall injury  11/5/2024 2216 by Alyssa Pryor RN  Outcome: Progressing  Flowsheets (Taken 11/5/2024 2216)  Free From Fall Injury: Instruct family/caregiver on patient safety   Care plan reviewed with patient. patient verbalize understanding of plan of care and contribute to goal setting.

## 2024-11-06 NOTE — CARE COORDINATION
11/6/24, 1:49 PM EST    DISCHARGE ON GOING EVALUATION    Gabino Meo Nowak       [facility] day: 4  Location: 6A-09/009-A Reason for admit: Acute appendicitis with generalized peritonitis, unspecified whether abscess present, unspecified whether gangrene present, unspecified whether perforation present [K35.209]  Status post appendectomy [Z90.49]  Cecum mass [K63.89]     Procedures:     11/2 ATTEMPTED APPENDECTOMY LAPAROSCOPIC, CONVERTED TO OPEN RIGHT COLON RESECTION     Imaging since last note: none    Barriers to Discharge: POD 2. Clear liquids. Incision monitoring. Abdominal binder. IV zosyn PRN antiemetics. Pain control.     PCP: No primary care provider on file.  Readmission Risk Score: 4.8    Patient Goals/Plan/Treatment Preferences: Plans to return home with his wife. He is independent. Denied need for DME and declines HH. He is agreeable to [facility] for PCP. Per Cherry at [employer], encouraged pt to get [place] Medicaid switched to [place].

## 2024-11-07 LAB
BACTERIA BLD AEROBE CULT: NORMAL
BACTERIA BLD AEROBE CULT: NORMAL

## 2024-11-07 PROCEDURE — 2580000003 HC RX 258: Performed by: SURGERY

## 2024-11-07 PROCEDURE — 6360000002 HC RX W HCPCS: Performed by: SURGERY

## 2024-11-07 PROCEDURE — 6370000000 HC RX 637 (ALT 250 FOR IP): Performed by: SURGERY

## 2024-11-07 PROCEDURE — 1200000000 HC SEMI PRIVATE

## 2024-11-07 RX ADMIN — PIPERACILLIN AND TAZOBACTAM 3375 MG: 3; .375 INJECTION, POWDER, FOR SOLUTION INTRAVENOUS at 06:46

## 2024-11-07 RX ADMIN — SODIUM CHLORIDE: 9 INJECTION, SOLUTION INTRAVENOUS at 08:10

## 2024-11-07 RX ADMIN — AMOXICILLIN AND CLAVULANATE POTASSIUM 1 TABLET: 875; 125 TABLET, FILM COATED ORAL at 21:09

## 2024-11-07 ASSESSMENT — PAIN SCALES - GENERAL: PAINLEVEL_OUTOF10: 0

## 2024-11-07 NOTE — PLAN OF CARE
Problem: Discharge Planning  Goal: Discharge to home or other facility with appropriate resources  11/6/2024 2334 by Salima Bianchi RN  Outcome: Progressing  Flowsheets (Taken 11/6/2024 0949 by Sturgeon, Cara B, RN)  Discharge to home or other facility with appropriate resources:   Identify barriers to discharge with patient and caregiver   Identify discharge learning needs (meds, wound care, etc)       Problem: Pain  Goal: Verbalizes/displays adequate comfort level or baseline comfort level  11/6/2024 2334 by Salima Bianchi RN  Outcome: Progressing  Flowsheets (Taken 11/6/2024 0949 by Sturgeon, Cara B, RN)  Verbalizes/displays adequate comfort level or baseline comfort level:   Encourage patient to monitor pain and request assistance   Assess pain using appropriate pain scale   Administer analgesics based on type and severity of pain and evaluate response   Implement non-pharmacological measures as appropriate and evaluate response     Problem: ABCDS Injury Assessment  Goal: Absence of physical injury  11/6/2024 2334 by Salima Bianchi RN  Outcome: Progressing  Flowsheets (Taken 11/6/2024 0949 by Sturgeon, Cara B, RN)  Absence of Physical Injury: Implement safety measures based on patient assessment     Problem: Safety - Adult  Goal: Free from fall injury  11/6/2024 2334 by Salima Bianchi RN  Outcome: Progressing  Flowsheets (Taken 11/6/2024 0949 by Sturgeon, Cara B, RN)  Free From Fall Injury: Instruct family/caregiver on patient safety     Problem: Gastrointestinal - Adult  Goal: Minimal or absence of nausea and vomiting  11/6/2024 2334 by Salima Bianchi RN  Outcome: Progressing  Flowsheets (Taken 11/6/2024 0949 by Sturgeon, Cara B, RN)  Minimal or absence of nausea and vomiting:   Administer IV fluids as ordered to ensure adequate hydration   Administer ordered antiemetic medications as needed   Provide nonpharmacologic comfort measures as appropriate     Problem: Gastrointestinal

## 2024-11-07 NOTE — PLAN OF CARE
Problem: Discharge Planning  Goal: Discharge to home or other facility with appropriate resources  11/7/2024 0901 by Anita Becker RN  Outcome: Progressing  Flowsheets (Taken 11/7/2024 0901)  Discharge to home or other facility with appropriate resources:   Identify barriers to discharge with patient and caregiver   Identify discharge learning needs (meds, wound care, etc)   Arrange for needed discharge resources and transportation as appropriate   Arrange for interpreters to assist at discharge as needed     Problem: Pain  Goal: Verbalizes/displays adequate comfort level or baseline comfort level  11/7/2024 0901 by Anita Becker RN  Outcome: Progressing  Flowsheets (Taken 11/7/2024 0901)  Verbalizes/displays adequate comfort level or baseline comfort level:   Encourage patient to monitor pain and request assistance   Administer analgesics based on type and severity of pain and evaluate response   Implement non-pharmacological measures as appropriate and evaluate response   Assess pain using appropriate pain scale     Problem: ABCDS Injury Assessment  Goal: Absence of physical injury  11/7/2024 0901 by Anita Becker RN  Outcome: Progressing  Flowsheets (Taken 11/7/2024 0901)  Absence of Physical Injury: Implement safety measures based on patient assessment     Problem: Safety - Adult  Goal: Free from fall injury  11/7/2024 0901 by Anita Becker RN  Outcome: Progressing  Flowsheets (Taken 11/7/2024 0901)  Free From Fall Injury:   Instruct family/caregiver on patient safety   Based on caregiver fall risk screen, instruct family/caregiver to ask for assistance with transferring infant if caregiver noted to have fall risk factors     Problem: Gastrointestinal - Adult  Goal: Minimal or absence of nausea and vomiting  11/7/2024 0901 by Anita Becker RN  Outcome: Progressing  Flowsheets (Taken 11/7/2024 0901)  Minimal or absence of nausea and vomiting:   Nasogastric tube to low  intermittent suction as ordered   Administer IV fluids as ordered to ensure adequate hydration   Maintain NPO status until nausea and vomiting are resolved   Administer ordered antiemetic medications as needed     Problem: Infection - Adult  Goal: Absence of infection during hospitalization  11/7/2024 0901 by Anita Becker RN  Outcome: Progressing  Flowsheets (Taken 11/7/2024 0901)  Absence of infection during hospitalization:   Assess and monitor for signs and symptoms of infection   Monitor lab/diagnostic results   Monitor all insertion sites i.e., indwelling lines, tubes and drains   Care plan reviewed with Pt. Pt verbalizes understanding of plan of care and contributes to goal setting.

## 2024-11-07 NOTE — PROGRESS NOTES
SurgPOD#6 Patient is doing well no nausea or vomiting abdomen is soft staple line looks goodWill stop IV antibiotics changed to oral stop IV fluids increase to regular dietDoing well Hope to discharge tomorrow

## 2024-11-07 NOTE — CARE COORDINATION
11/7/24, 11:30 AM EST    DISCHARGE ON GOING EVALUATION    Gabino Moe Nowak       [facility] day: 5  Location: 6A-09/009-A Reason for admit: Acute appendicitis with generalized peritonitis, unspecified whether abscess present, unspecified whether gangrene present, unspecified whether perforation present [K35.209]  Status post appendectomy [Z90.49]  Cecum mass [K63.89]     Procedures:   11/2 ATTEMPTED APPENDECTOMY LAPAROSCOPIC, CONVERTED TO OPEN RIGHT COLON RESECTION     Imaging since last note: none     Barriers to Discharge: GS following. POD 6. Advanced to full liquid diet. IVF@125. IV zosyn q8h. Scopolamine patch. (+) BM.     PCP: No primary care provider on file.  Readmission Risk Score: 4.8    Patient Goals/Plan/Treatment Preferences: Return home w/ wife. Denies discharge needs. [facility].

## 2024-11-08 VITALS
TEMPERATURE: 98.1 F | DIASTOLIC BLOOD PRESSURE: 65 MMHG | WEIGHT: 180 LBS | HEART RATE: 73 BPM | RESPIRATION RATE: 16 BRPM | SYSTOLIC BLOOD PRESSURE: 120 MMHG | OXYGEN SATURATION: 98 %

## 2024-11-08 PROCEDURE — 6370000000 HC RX 637 (ALT 250 FOR IP): Performed by: SURGERY

## 2024-11-08 RX ORDER — OXYCODONE AND ACETAMINOPHEN 5; 325 MG/1; MG/1
1 TABLET ORAL EVERY 6 HOURS PRN
Qty: 16 TABLET | Refills: 0 | Status: SHIPPED | OUTPATIENT
Start: 2024-11-08 | End: 2024-11-13

## 2024-11-08 RX ADMIN — AMOXICILLIN AND CLAVULANATE POTASSIUM 1 TABLET: 875; 125 TABLET, FILM COATED ORAL at 08:39

## 2024-11-08 ASSESSMENT — PAIN SCALES - GENERAL: PAINLEVEL_OUTOF10: 0

## 2024-11-08 NOTE — PLAN OF CARE
non-pharmacological measures as appropriate and evaluate response   Assess pain using appropriate pain scale     Problem: ABCDS Injury Assessment  Goal: Absence of physical injury  11/7/2024 2243 by Adama Hsu RN  Outcome: Progressing  Flowsheets (Taken 11/7/2024 0901 by Anita Becker RN)  Absence of Physical Injury: Implement safety measures based on patient assessment  11/7/2024 0901 by Anita Becker RN  Outcome: Progressing  Flowsheets (Taken 11/7/2024 0901)  Absence of Physical Injury: Implement safety measures based on patient assessment     Problem: Safety - Adult  Goal: Free from fall injury  11/7/2024 2243 by Adama Hsu RN  Outcome: Progressing  Flowsheets (Taken 11/7/2024 0901 by Anita Becker RN)  Free From Fall Injury:   Instruct family/caregiver on patient safety   Based on caregiver fall risk screen, instruct family/caregiver to ask for assistance with transferring infant if caregiver noted to have fall risk factors  11/7/2024 0901 by Anita Becker RN  Outcome: Progressing  Flowsheets (Taken 11/7/2024 0901)  Free From Fall Injury:   Instruct family/caregiver on patient safety   Based on caregiver fall risk screen, instruct family/caregiver to ask for assistance with transferring infant if caregiver noted to have fall risk factors     Problem: Gastrointestinal - Adult  Goal: Minimal or absence of nausea and vomiting  11/7/2024 2243 by Adama Hsu RN  Outcome: Progressing  Flowsheets (Taken 11/7/2024 2000)  Minimal or absence of nausea and vomiting:   Administer IV fluids as ordered to ensure adequate hydration   Provide nonpharmacologic comfort measures as appropriate   Advance diet as tolerated, if ordered  11/7/2024 0901 by Anita Becker RN  Outcome: Progressing  Flowsheets (Taken 11/7/2024 0901)  Minimal or absence of nausea and vomiting:   Nasogastric tube to low intermittent suction as ordered   Administer IV fluids as ordered to ensure  plan of care and contributes towards goals of care.      Patient educated on how to use incentive spirometer. Patient verbalized understanding and demonstrated proper use. Emphasized importance and usage of device, with coughing and deep breathing every 2 hours while awake.

## 2024-11-08 NOTE — DISCHARGE INSTRUCTIONS
Office next thurs    CARING FOR YOUR INCISION AT HOME    Wash your hands thoroughly with soap and water before touching your incision.     Take a clean wash cloth with DYNAHEX and water and cleanse the incision. Pat dry with a clean towel.     Steri strips will fall off on their own. Do NOT pick and or pull at them.     Let the incision air dry before applying new dressing if needed.     Do NOT place anything in or on your incision other than a clean dressing.     Do NOT clean your incision with anything other than soap and water unless you were instructed to do so by your physician.     Do NOT touch your incision unless your hands are clean and you are performing incision care. Try to touch it at least as possible.     NO powders, lotions, perfumes, or deodorants near incisions.     If you notice an increase in drainage, only use a clean piece of gauze to cover the incision and notify your physician.         No lifting over 15 pounds  May shower  Doctor will remove staples on Thursday in office  Keep incision clean and dry  Clean abdomen with myles hex soap everyday

## 2024-11-08 NOTE — CARE COORDINATION
11/8/24, 9:07 AM EST    DISCHARGE ON GOING EVALUATION    Gabino Moe Nowak       [facility] day: 6  Location: 6A-09/009-A Reason for admit: Acute appendicitis with generalized peritonitis, unspecified whether abscess present, unspecified whether gangrene present, unspecified whether perforation present [K35.209]  Status post appendectomy [Z90.49]  Cecum mass [K63.89]     Procedures:   11/2 ATTEMPTED APPENDECTOMY LAPAROSCOPIC, CONVERTED TO OPEN RIGHT COLON RESECTION        Imaging since last note: none    Barriers to Discharge: GS following. POD 6. Regular diet, Augmentin,. (+) BM.     PCP: No primary care provider on file.  Readmission Risk Score: 4.8    Patient Goals/Plan/Treatment Preferences:  Return home w/ wife. Denies discharge needs. [facility].  11/8/24, 10:57 AM EST    Patient goals/plan/ treatment preferences discussed by  and .  Patient goals/plan/ treatment preferences reviewed with patient/ family.  Patient/ family verbalize understanding of discharge plan and are in agreement with goal/plan/treatment preferences.  Understanding was demonstrated using the teach back method.  AVS provided by RN at time of discharge, which includes all necessary medical information pertaining to the patients current course of illness, treatment, post-discharge goals of care, and treatment preferences.     Services At/After Discharge: None

## 2024-11-09 NOTE — DISCHARGE SUMMARY
[address] [address]                            DISCHARGE SUMMARY      PATIENT NAME: BRIAN ROSALES           : 2000  MED REC NO: [id number]                       ROOM: 6A-09  ACCOUNT NO: [id number]                       ADMIT DATE: 2024  PROVIDER: Pietro Castro MD      DISCHARGE DIAGNOSIS:  Cecal diverticulitis.    OPERATIONS PERFORMED:    1. Laparoscopy.  2. Right colon resection.    CONSULTATIONS:  None.    COMPLICATIONS:  None.    HOSPITAL COURSE:  The patient is a 24-year-old, [facility] male, who had presented with what was thought to be possible acute appendicitis.  He was taken to surgery early in the morning of the  and on laparoscopy, the appendix appeared normal, but there clearly was an inflammation of the cecum, which had been read on CT scan.  It was clear we had to do a limited right colon resection that was performed.  Postop, he had pain as expected, but he progressed nicely.  He was able to be started on a diet, he was having bowel movements for which he tolerated.  His white blood cell count came down as he was maintained on antibiotics.  Final pathology came back as an unusual perforated cecal diverticulitis.  Although there was somewhat of a language barrier, we used the  to talk with the patient and tried to explain to him the process and what the actual pathology was.  On the day of discharge, he was doing well, tolerating a diet.  No abdominal pain.  He will be discharged to home.  He will be discharged on Percocet for pain.  He will be followed up in my office in 6 days.  We gave all our instructions and the fact that we are available via the phone and my office numbers, 24 hours a day.  We gave him my card, although again indicated when he comes to the office, we will remove the staples then.          PIETRO CASTRO MD      D:  2024 10:58:04     T:  2024 02:58:34

## 2024-11-14 ENCOUNTER — HOSPITAL ENCOUNTER (EMERGENCY)
Age: 24
Discharge: HOME | End: 2024-11-14
Attending: FAMILY MEDICINE
Payer: MEDICAID

## 2024-11-14 VITALS
HEART RATE: 95 BPM | RESPIRATION RATE: 16 BRPM | DIASTOLIC BLOOD PRESSURE: 81 MMHG | TEMPERATURE: 98.1 F | SYSTOLIC BLOOD PRESSURE: 116 MMHG | OXYGEN SATURATION: 95 %

## 2024-11-14 DIAGNOSIS — Z48.02 ENCOUNTER FOR STAPLE REMOVAL: Primary | ICD-10-CM

## 2024-11-14 PROCEDURE — 99282 EMERGENCY DEPT VISIT SF MDM: CPT

## 2024-11-14 NOTE — ED TRIAGE NOTES
Presents to ER to have sutures removed. Reports surgery 11/09.  Reports having appendix removed here at [facility].  He reports he was supposed to have a sutures removed but appt was cancelled and rescheduled for Tuesday. He reports pain in area of sutures.  Incision clean.  Small open area around 1 suture. Denies any fever or chills.

## 2024-11-14 NOTE — ED PROVIDER NOTES
[facility] EMERGENCY DEPT  EMERGENCY DEPARTMENT ENCOUNTER          Pt Name: Gabino Nowak  MRN: [id number]  Birthdate 2000  Date of evaluation: 11/14/2024  Physician: Kandy Lee MD  Supervising Attending Physician: Dami Bernard MD       CHIEF COMPLAINT       Chief Complaint   Patient presents with    Post-op Problem         HISTORY OF PRESENT ILLNESS    HPI  Gabino Nowak is a 24 y.o. male who presents to the emergency department from home, by private vehicle for evaluation of postop problem    Patient comes in reporting that he was supposed to have his sutures removed today at his surgeon's office however he was called and had his appointment postponed until next Tuesday.  Patient denies any wound dehiscence or discharge or pain, he reports good appetite and oral intake and normal bowel movements.  He does report having irritation when he is sitting up from the staples and is requesting for them to be removed.  He denied any fever or chills  The patient has no other acute complaints at this time.      REVIEW OF SYSTEMS   Review of Systems      PAST MEDICAL AND SURGICAL HISTORY   No past medical history on file.  Past Surgical History:   Procedure Laterality Date    LAPAROSCOPIC APPENDECTOMY N/A 11/2/2024    ATTEMPTED APPENDECTOMY LAPAROSCOPIC, CONVERTED TO OPEN RIGHT COLON RESECTION performed by Pietro Castro MD at [facility] OR         MEDICATIONS   No current facility-administered medications for this encounter.  No current outpatient medications on file.    Previous Medications    No medications on file         SOCIAL HISTORY     Social History     Social History Narrative    Not on file            ALLERGIES   No Known Allergies      FAMILY HISTORY   No family history on file.      PHYSICAL EXAM     ED Triage Vitals [11/14/24 1701]   BP Systolic BP Percentile Diastolic BP Percentile Temp Temp Source Pulse Respirations SpO2   116/81 -- -- 98.1 °F (36.7 °C) Oral 95 16 95 %

## (undated) DEVICE — YANKAUER,BULB TIP,W/O VENT,RIGID,STERILE: Brand: MEDLINE

## (undated) DEVICE — UNIVERSAL MONOPOLAR LAPAROSCOPIC CABLE 10FT, 4MM PIN CONNECTOR: Brand: CONMED

## (undated) DEVICE — BANDAGE ADH W1XL3IN NAT FAB WVN FLX DURABLE N ADH PD SEAL

## (undated) DEVICE — COUNTER NDL 40 COUNT HLD 70 FOAM BLK ADH W/ MAG

## (undated) DEVICE — TROCAR: Brand: KII SHIELDED BLADED ACCESS SYSTEM

## (undated) DEVICE — INSUFFLATION NEEDLE TO ESTABLISH PNEUMOPERITONEUM.: Brand: INSUFFLATION NEEDLE

## (undated) DEVICE — STAPLER INT L75MM CUT LN L73MM STPL LN L77MM BLU B FRM 8

## (undated) DEVICE — GLOVE SURG SZ 7.5 L11.73IN FNGR THK9.8MIL STRW LTX POLYMER

## (undated) DEVICE — Device

## (undated) DEVICE — PAD,EYE,1-5/8X2 5/8,STERILE,LF,1/PK: Brand: MEDLINE

## (undated) DEVICE — STAPLER INT L60MM REG TISS BLU B FRM 8 FIRING 2 ROW AUTO

## (undated) DEVICE — BLADE ES L6IN ELASTOMERIC COAT EXT DURABLE BEND UPTO 90DEG

## (undated) DEVICE — SUTURE VICRYL + SZ 0 L27IN ABSRB UD CT-1 L36MM 1/2 CIR TAPR VCP260H

## (undated) DEVICE — SUTURE VICRYL PLUS TAPERPOINT ANTIBAC BRD SH NDL 3-0

## (undated) DEVICE — PUMP SUC IRR TBNG L10FT W/ HNDPC ASSEMB STRYKEFLOW 2

## (undated) DEVICE — TUBING INSUFFLATION SMK EVAC HI FLO SET PNEUMOCLEAR

## (undated) DEVICE — RELOAD STPL L75MM OPN H3.8MM CLS 1.5MM WIRE DIA0.2MM REG

## (undated) DEVICE — PAD,ABDOMINAL,5"X9",ST,LF,25/BX: Brand: MEDLINE INDUSTRIES, INC.

## (undated) DEVICE — 450 ML BOTTLE OF 0.05% CHLORHEXIDINE GLUCONATE IN 99.95% STERILE WATER FOR IRRIGATION, USP AND APPLICATOR.: Brand: IRRISEPT ANTIMICROBIAL WOUND LAVAGE

## (undated) DEVICE — SPONGE LAP W18XL18IN WHT COT 4 PLY FLD STRUNG RADPQ DISP ST 2 PER PACK

## (undated) DEVICE — TUBING, SUCTION, 1/4" X 20', STRAIGHT: Brand: MEDLINE INDUSTRIES, INC.

## (undated) DEVICE — SUTURE VICRYL + 1 L18IN ABSRB UD CTX L48MM 1/2 CIR TAPERPOINT

## (undated) DEVICE — GENERAL LAPAROSCOPY-LF: Brand: MEDLINE INDUSTRIES, INC.

## (undated) DEVICE — YANKAUER,POOLE TIP,STERILE,50/CS: Brand: MEDLINE